# Patient Record
Sex: MALE | Race: WHITE | NOT HISPANIC OR LATINO | Employment: UNEMPLOYED | URBAN - METROPOLITAN AREA
[De-identification: names, ages, dates, MRNs, and addresses within clinical notes are randomized per-mention and may not be internally consistent; named-entity substitution may affect disease eponyms.]

---

## 2018-08-07 ENCOUNTER — HOSPITAL ENCOUNTER (EMERGENCY)
Facility: HOSPITAL | Age: 28
Discharge: HOME | End: 2018-08-07
Attending: FAMILY MEDICINE
Payer: COMMERCIAL

## 2018-08-07 VITALS
OXYGEN SATURATION: 100 % | TEMPERATURE: 99 F | DIASTOLIC BLOOD PRESSURE: 89 MMHG | WEIGHT: 243 LBS | HEART RATE: 120 BPM | HEIGHT: 73 IN | BODY MASS INDEX: 32.2 KG/M2 | RESPIRATION RATE: 17 BRPM | SYSTOLIC BLOOD PRESSURE: 152 MMHG

## 2018-08-07 DIAGNOSIS — S67.10XA CRUSHING INJURY OF FINGER, INITIAL ENCOUNTER: Primary | ICD-10-CM

## 2018-08-07 PROCEDURE — 73140 X-RAY EXAM OF FINGER(S): CPT | Mod: TC,FY,LT

## 2018-08-07 PROCEDURE — 99283 EMERGENCY DEPT VISIT LOW MDM: CPT | Mod: 25

## 2018-08-07 PROCEDURE — 25000003 PHARM REV CODE 250: Performed by: FAMILY MEDICINE

## 2018-08-07 PROCEDURE — 73140 X-RAY EXAM OF FINGER(S): CPT | Mod: 26,LT,, | Performed by: RADIOLOGY

## 2018-08-07 RX ORDER — CLINDAMYCIN HYDROCHLORIDE 150 MG/1
300 CAPSULE ORAL
Status: COMPLETED | OUTPATIENT
Start: 2018-08-07 | End: 2018-08-07

## 2018-08-07 RX ORDER — HYDROCODONE BITARTRATE AND ACETAMINOPHEN 5; 325 MG/1; MG/1
1 TABLET ORAL
Status: COMPLETED | OUTPATIENT
Start: 2018-08-07 | End: 2018-08-07

## 2018-08-07 RX ORDER — CLINDAMYCIN HYDROCHLORIDE 150 MG/1
300 CAPSULE ORAL EVERY 8 HOURS
Qty: 42 CAPSULE | Refills: 0 | Status: SHIPPED | OUTPATIENT
Start: 2018-08-07 | End: 2018-08-14

## 2018-08-07 RX ORDER — KETOROLAC TROMETHAMINE 10 MG/1
10 TABLET, FILM COATED ORAL EVERY 6 HOURS PRN
Qty: 12 TABLET | Refills: 0 | Status: SHIPPED | OUTPATIENT
Start: 2018-08-07 | End: 2020-03-13 | Stop reason: CLARIF

## 2018-08-07 RX ADMIN — HYDROCODONE BITARTRATE AND ACETAMINOPHEN 1 TABLET: 5; 325 TABLET ORAL at 02:08

## 2018-08-07 RX ADMIN — CLINDAMYCIN HYDROCHLORIDE 300 MG: 150 CAPSULE ORAL at 02:08

## 2018-08-07 NOTE — ED PROVIDER NOTES
22Encounter Date: 8/7/2018       History     Chief Complaint   Patient presents with    Hand Pain     Pt complains of pain to tip of left middle finger after sustaining a crush injury ealier today. No laceration.           Review of patient's allergies indicates:  No Known Allergies  No past medical history on file.  No past surgical history on file.  No family history on file.  Social History   Substance Use Topics    Smoking status: Not on file    Smokeless tobacco: Not on file    Alcohol use Not on file     Review of Systems   Constitutional: Negative.    HENT: Negative.    Eyes: Negative.    Respiratory: Negative.    Cardiovascular: Negative.    Gastrointestinal: Negative.    Endocrine: Negative.    Genitourinary: Negative.    Musculoskeletal:        See HPI   Neurological: Negative.    Hematological: Negative.    Psychiatric/Behavioral: Negative.        Physical Exam     Initial Vitals [08/07/18 0200]   BP Pulse Resp Temp SpO2   (!) 152/89 (!) 120 17 98.8 °F (37.1 °C) 100 %      MAP       --         Physical Exam    Nursing note and vitals reviewed.  Constitutional: He appears well-developed and well-nourished. He is not diaphoretic. No distress.   HENT:   Head: Normocephalic and atraumatic.   Eyes: Pupils are equal, round, and reactive to light.   Neck: Normal range of motion. Neck supple.   Cardiovascular: Normal rate, regular rhythm, normal heart sounds and intact distal pulses. Exam reveals no gallop and no friction rub.    No murmur heard.  Pulmonary/Chest: Breath sounds normal. No respiratory distress. He has no wheezes. He has no rhonchi. He has no rales. He exhibits no tenderness.   Abdominal: Soft. Bowel sounds are normal. He exhibits no distension. There is no tenderness. There is no rebound and no guarding.   Musculoskeletal: Normal range of motion. He exhibits tenderness. He exhibits no edema.   No joint deformity noted, no laceration to left middle finger. No subungual hematoma noted. Mild  bruising.    Neurological: He is alert and oriented to person, place, and time. He has normal strength.   Skin: Skin is warm and dry. Capillary refill takes less than 2 seconds.   Psychiatric: He has a normal mood and affect. His behavior is normal. Judgment and thought content normal.         ED Course   Procedures  Labs Reviewed - No data to display       Imaging Results          X-Ray Finger 2 or More Views Left (In process)                                    ED Course as of Aug 07 0242   Tue Aug 07, 2018   0236 X-ray to finger shows no fracture or dislocation.  Mild soft tissue swelling noted at area of injury.  [MD]   0238 No subungual hematoma.   [MD]      ED Course User Index  [MD] Adeola Randhawa MD     Clinical Impression:   The encounter diagnosis was Crushing injury of finger, initial encounter.                             Adeola Randhawa MD  08/07/18 0242       Adeola Randhawa MD  09/18/18 0933

## 2018-08-07 NOTE — ED TRIAGE NOTES
Patient arrives POV with wife tonight complaining of pain in the tip of the left middle finger. Patient states that he smashed his finger while working on an [employer] engine. Patient denies trying to relieve pain with ice or over the counter pain medicine. Patient rates pain at 5/10.

## 2020-03-13 ENCOUNTER — HOSPITAL ENCOUNTER (EMERGENCY)
Facility: HOSPITAL | Age: 30
Discharge: HOME | End: 2020-03-13
Attending: INTERNAL MEDICINE
Payer: COMMERCIAL

## 2020-03-13 VITALS
WEIGHT: 225 LBS | BODY MASS INDEX: 29.82 KG/M2 | OXYGEN SATURATION: 100 % | RESPIRATION RATE: 20 BRPM | SYSTOLIC BLOOD PRESSURE: 141 MMHG | TEMPERATURE: 99 F | HEART RATE: 104 BPM | HEIGHT: 73 IN | DIASTOLIC BLOOD PRESSURE: 73 MMHG

## 2020-03-13 DIAGNOSIS — K52.9 GASTROENTERITIS: Primary | ICD-10-CM

## 2020-03-13 DIAGNOSIS — R11.14 BILIOUS VOMITING WITH NAUSEA: ICD-10-CM

## 2020-03-13 LAB
ALBUMIN SERPL BCP-MCNC: 5.1 G/DL (ref 3.5–5.2)
ALP SERPL-CCNC: 69 U/L (ref 55–135)
ALT SERPL W/O P-5'-P-CCNC: 36 U/L (ref 10–44)
AMPHET+METHAMPHET UR QL: NEGATIVE
ANION GAP SERPL CALC-SCNC: 15 MMOL/L (ref 8–16)
AST SERPL-CCNC: 32 U/L (ref 10–40)
BARBITURATES UR QL SCN>200 NG/ML: NEGATIVE
BASOPHILS # BLD AUTO: 0.03 K/UL (ref 0–0.2)
BASOPHILS NFR BLD: 0.1 % (ref 0–1.9)
BENZODIAZ UR QL SCN>200 NG/ML: NEGATIVE
BILIRUB SERPL-MCNC: 1.1 MG/DL (ref 0.1–1)
BILIRUB UR QL STRIP: NEGATIVE
BUN SERPL-MCNC: 10 MG/DL (ref 6–20)
BZE UR QL SCN: NEGATIVE
CALCIUM SERPL-MCNC: 10.1 MG/DL (ref 8.7–10.5)
CANNABINOIDS UR QL SCN: NORMAL
CHLORIDE SERPL-SCNC: 102 MMOL/L (ref 95–110)
CLARITY UR: CLEAR
CO2 SERPL-SCNC: 25 MMOL/L (ref 23–29)
COLOR UR: YELLOW
CREAT SERPL-MCNC: 0.7 MG/DL (ref 0.5–1.4)
CREAT UR-MCNC: 166.5 MG/DL (ref 23–375)
DIFFERENTIAL METHOD: ABNORMAL
EOSINOPHIL # BLD AUTO: 0 K/UL (ref 0–0.5)
EOSINOPHIL NFR BLD: 0 % (ref 0–8)
ERYTHROCYTE [DISTWIDTH] IN BLOOD BY AUTOMATED COUNT: 12.1 % (ref 11.5–14.5)
EST. GFR  (AFRICAN AMERICAN): >60 ML/MIN/1.73 M^2
EST. GFR  (NON AFRICAN AMERICAN): >60 ML/MIN/1.73 M^2
GLUCOSE SERPL-MCNC: 130 MG/DL (ref 70–110)
GLUCOSE UR QL STRIP: NEGATIVE
GROUP A STREP, MOLECULAR: NEGATIVE
HCT VFR BLD AUTO: 46.7 % (ref 40–54)
HGB BLD-MCNC: 16.2 G/DL (ref 14–18)
HGB UR QL STRIP: NEGATIVE
IMM GRANULOCYTES # BLD AUTO: 0.08 K/UL (ref 0–0.04)
IMM GRANULOCYTES NFR BLD AUTO: 0.4 % (ref 0–0.5)
KETONES UR QL STRIP: ABNORMAL
LACTATE SERPL-SCNC: 1.9 MMOL/L (ref 0.5–2.2)
LEUKOCYTE ESTERASE UR QL STRIP: NEGATIVE
LIPASE SERPL-CCNC: 24 U/L (ref 4–60)
LYMPHOCYTES # BLD AUTO: 1.4 K/UL (ref 1–4.8)
LYMPHOCYTES NFR BLD: 6.2 % (ref 18–48)
MCH RBC QN AUTO: 29.6 PG (ref 27–31)
MCHC RBC AUTO-ENTMCNC: 34.7 G/DL (ref 32–36)
MCV RBC AUTO: 85 FL (ref 82–98)
MONOCYTES # BLD AUTO: 0.6 K/UL (ref 0.3–1)
MONOCYTES NFR BLD: 2.7 % (ref 4–15)
NEUTROPHILS # BLD AUTO: 19.9 K/UL (ref 1.8–7.7)
NEUTROPHILS NFR BLD: 90.6 % (ref 38–73)
NITRITE UR QL STRIP: NEGATIVE
NRBC BLD-RTO: 0 /100 WBC
OPIATES UR QL SCN: NEGATIVE
PCP UR QL SCN>25 NG/ML: NEGATIVE
PH UR STRIP: >=9 [PH] (ref 5–8)
PLATELET # BLD AUTO: 317 K/UL (ref 150–350)
PMV BLD AUTO: 9.8 FL (ref 9.2–12.9)
POTASSIUM SERPL-SCNC: 3.7 MMOL/L (ref 3.5–5.1)
PROT SERPL-MCNC: 8.7 G/DL (ref 6–8.4)
PROT UR QL STRIP: ABNORMAL
RBC # BLD AUTO: 5.48 M/UL (ref 4.6–6.2)
SODIUM SERPL-SCNC: 142 MMOL/L (ref 136–145)
SP GR UR STRIP: 1.01 (ref 1–1.03)
TOXICOLOGY INFORMATION: NORMAL
URN SPEC COLLECT METH UR: ABNORMAL
UROBILINOGEN UR STRIP-ACNC: NEGATIVE EU/DL
WBC # BLD AUTO: 21.92 K/UL (ref 3.9–12.7)

## 2020-03-13 PROCEDURE — 96365 THER/PROPH/DIAG IV INF INIT: CPT | Mod: 59

## 2020-03-13 PROCEDURE — 96367 TX/PROPH/DG ADDL SEQ IV INF: CPT

## 2020-03-13 PROCEDURE — 81003 URINALYSIS AUTO W/O SCOPE: CPT | Mod: 59

## 2020-03-13 PROCEDURE — 83605 ASSAY OF LACTIC ACID: CPT

## 2020-03-13 PROCEDURE — 87651 STREP A DNA AMP PROBE: CPT

## 2020-03-13 PROCEDURE — 96361 HYDRATE IV INFUSION ADD-ON: CPT

## 2020-03-13 PROCEDURE — 80053 COMPREHEN METABOLIC PANEL: CPT

## 2020-03-13 PROCEDURE — 74177 CT ABD & PELVIS W/CONTRAST: CPT | Mod: TC

## 2020-03-13 PROCEDURE — 96375 TX/PRO/DX INJ NEW DRUG ADDON: CPT

## 2020-03-13 PROCEDURE — 80307 DRUG TEST PRSMV CHEM ANLYZR: CPT

## 2020-03-13 PROCEDURE — 74177 CT ABD & PELVIS W/CONTRAST: CPT | Mod: 26,,, | Performed by: RADIOLOGY

## 2020-03-13 PROCEDURE — 85025 COMPLETE CBC W/AUTO DIFF WBC: CPT

## 2020-03-13 PROCEDURE — 99285 EMERGENCY DEPT VISIT HI MDM: CPT | Mod: 25

## 2020-03-13 PROCEDURE — 83690 ASSAY OF LIPASE: CPT

## 2020-03-13 PROCEDURE — 74177 CT ABDOMEN PELVIS WITH CONTRAST: ICD-10-PCS | Mod: 26,,, | Performed by: RADIOLOGY

## 2020-03-13 PROCEDURE — 25500020 PHARM REV CODE 255: Performed by: INTERNAL MEDICINE

## 2020-03-13 PROCEDURE — 63600175 PHARM REV CODE 636 W HCPCS: Performed by: INTERNAL MEDICINE

## 2020-03-13 RX ORDER — ONDANSETRON 2 MG/ML
4 INJECTION INTRAMUSCULAR; INTRAVENOUS
Status: COMPLETED | OUTPATIENT
Start: 2020-03-13 | End: 2020-03-13

## 2020-03-13 RX ORDER — CIPROFLOXACIN 500 MG/1
500 TABLET ORAL 2 TIMES DAILY
Qty: 20 TABLET | Refills: 0 | Status: SHIPPED | OUTPATIENT
Start: 2020-03-13 | End: 2020-03-13 | Stop reason: SDUPTHER

## 2020-03-13 RX ORDER — CIPROFLOXACIN 500 MG/1
500 TABLET ORAL 2 TIMES DAILY
Qty: 20 TABLET | Refills: 0 | Status: SHIPPED | OUTPATIENT
Start: 2020-03-13 | End: 2020-03-23

## 2020-03-13 RX ORDER — METRONIDAZOLE 500 MG/1
500 TABLET ORAL 3 TIMES DAILY
Qty: 21 TABLET | Refills: 0 | Status: SHIPPED | OUTPATIENT
Start: 2020-03-13 | End: 2020-03-20

## 2020-03-13 RX ORDER — PROMETHAZINE HYDROCHLORIDE 25 MG/1
25 TABLET ORAL EVERY 6 HOURS PRN
Qty: 15 TABLET | Refills: 0 | Status: SHIPPED | OUTPATIENT
Start: 2020-03-13 | End: 2021-03-19 | Stop reason: CLARIF

## 2020-03-13 RX ORDER — ONDANSETRON 2 MG/ML
4 INJECTION INTRAMUSCULAR; INTRAVENOUS
Status: DISCONTINUED | OUTPATIENT
Start: 2020-03-13 | End: 2020-03-14 | Stop reason: HOSPADM

## 2020-03-13 RX ORDER — CIPROFLOXACIN 2 MG/ML
400 INJECTION, SOLUTION INTRAVENOUS
Status: COMPLETED | OUTPATIENT
Start: 2020-03-13 | End: 2020-03-13

## 2020-03-13 RX ORDER — CIPROFLOXACIN 2 MG/ML
400 INJECTION, SOLUTION INTRAVENOUS
Status: DISCONTINUED | OUTPATIENT
Start: 2020-03-13 | End: 2020-03-13

## 2020-03-13 RX ADMIN — CIPROFLOXACIN 400 MG: 2 INJECTION, SOLUTION INTRAVENOUS at 09:03

## 2020-03-13 RX ADMIN — PROMETHAZINE HYDROCHLORIDE 12.5 MG: 25 INJECTION INTRAMUSCULAR; INTRAVENOUS at 09:03

## 2020-03-13 RX ADMIN — ONDANSETRON 4 MG: 2 INJECTION INTRAMUSCULAR; INTRAVENOUS at 08:03

## 2020-03-13 RX ADMIN — IOHEXOL 100 ML: 350 INJECTION, SOLUTION INTRAVENOUS at 08:03

## 2020-03-13 RX ADMIN — SODIUM CHLORIDE 1000 ML: 0.9 INJECTION, SOLUTION INTRAVENOUS at 08:03

## 2020-03-14 NOTE — ED PROVIDER NOTES
Encounter Date: 3/13/2020       History     Chief Complaint   Patient presents with    Abdominal Pain     Patient complaining of abdominal pain N&V x36 hours.    Nausea    Vomiting     Patient is a 29-year-old male that presented with retching and nausea with vomiting.  Patient states this has occurred over the last 36 hr.  Patient has denied any knows exposure to food or any inciting agent.  Otherwise patient has had no fever chills.  Patient states that he has not able to take anything orally without throwing up.  Patient denies any other past medical history.  Social history patient uses alcohol and does admit some drug use.  Patient's temperature is 98° wound O2 sats 100% patient is tachycardic.        Review of patient's allergies indicates:  No Known Allergies  History reviewed. No pertinent past medical history.  Past Surgical History:   Procedure Laterality Date    FOOT SURGERY Left      History reviewed. No pertinent family history.  Social History     Tobacco Use    Smoking status: Current Every Day Smoker   Substance Use Topics    Alcohol use: Yes     Comment: occ    Drug use: Yes     Comment: Opiates     Review of Systems   Constitutional: Negative for activity change, appetite change, fatigue and fever.   HENT: Positive for sore throat. Negative for congestion, ear discharge, mouth sores, nosebleeds, rhinorrhea, sinus pressure, sinus pain and tinnitus.    Eyes: Negative.  Negative for pain, redness and itching.   Respiratory: Negative for apnea, cough, choking, chest tightness, shortness of breath, wheezing and stridor.    Cardiovascular: Negative for chest pain, palpitations and leg swelling.   Gastrointestinal: Positive for abdominal pain, nausea and vomiting. Negative for abdominal distention, anal bleeding, blood in stool, constipation and diarrhea.   Endocrine: Negative.    Genitourinary: Negative for difficulty urinating, flank pain, frequency and urgency.   Musculoskeletal: Negative for  arthralgias, back pain, gait problem and myalgias.   Skin: Negative for color change and pallor.   Allergic/Immunologic: Negative.    Neurological: Positive for dizziness, weakness and light-headedness. Negative for facial asymmetry and headaches.   Hematological: Negative for adenopathy. Does not bruise/bleed easily.   Psychiatric/Behavioral: The patient is nervous/anxious.        Physical Exam     Initial Vitals [03/13/20 1950]   BP Pulse Resp Temp SpO2   (!) 141/73 104 20 98.1 °F (36.7 °C) 100 %      MAP       --         Physical Exam    Nursing note and vitals reviewed.  Constitutional: He appears well-developed and well-nourished.   HENT:   Head: Normocephalic and atraumatic.   Cardiovascular: Normal rate and normal heart sounds.   Pulmonary/Chest: Breath sounds normal.   Abdominal: Bowel sounds are increased. There is generalized tenderness.   Neurological: He is alert.   Skin: Skin is warm and dry. Capillary refill takes less than 2 seconds.   Psychiatric: He has a normal mood and affect. His behavior is normal.         ED Course   Procedures  Labs Reviewed   CBC W/ AUTO DIFFERENTIAL   COMPREHENSIVE METABOLIC PANEL   LIPASE   URINALYSIS, REFLEX TO URINE CULTURE   LACTIC ACID, PLASMA          Imaging Results    None          Medical Decision Making:   Clinical Tests:   Lab Tests: Ordered and Reviewed  The following lab test(s) were unremarkable: CBC, CMP and Lipase  ED Management:  Patient was found have a 21,900 white count with left shift.  Patient has no fever.  Thought out nausea vomiting related to acute gastroenteritis that can be treated as an outpatient.  Patient will be placed on antibiotics empirically and symptomatically treated at home.  Patient was rehydrated here in the emergency department and will be past follow-up with primary care physician 1-2 days if no improvement                   ED Course as of Mar 13 2209   Fri Mar 13, 2020   2207 Patient with [place] 47[address].  CBC is  otherwise otherwise negative  Urinalysis was negative  Group a strep negative    [JK]      ED Course User Index  [JK] Umberto Granados MD                Clinical Impression:       ICD-10-CM ICD-9-CM   1. Gastroenteritis K52.9 558.9   2. Bilious vomiting with nausea R11.14 787.04                                Umberto Granados MD  03/13/20 2220

## 2020-03-20 ENCOUNTER — HOSPITAL ENCOUNTER (EMERGENCY)
Facility: HOSPITAL | Age: 30
Discharge: HOME | End: 2020-03-20
Attending: FAMILY MEDICINE
Payer: COMMERCIAL

## 2020-03-20 VITALS
OXYGEN SATURATION: 99 % | SYSTOLIC BLOOD PRESSURE: 128 MMHG | HEART RATE: 88 BPM | RESPIRATION RATE: 20 BRPM | DIASTOLIC BLOOD PRESSURE: 72 MMHG | WEIGHT: 220 LBS | TEMPERATURE: 99 F | BODY MASS INDEX: 29.16 KG/M2 | HEIGHT: 73 IN

## 2020-03-20 DIAGNOSIS — J02.9 ACUTE PHARYNGITIS, UNSPECIFIED ETIOLOGY: Primary | ICD-10-CM

## 2020-03-20 LAB
ALBUMIN SERPL BCP-MCNC: 4.5 G/DL (ref 3.5–5.2)
ALP SERPL-CCNC: 58 U/L (ref 55–135)
ALT SERPL W/O P-5'-P-CCNC: 21 U/L (ref 10–44)
ANION GAP SERPL CALC-SCNC: 8 MMOL/L (ref 8–16)
AST SERPL-CCNC: 19 U/L (ref 10–40)
BASOPHILS # BLD AUTO: 0.03 K/UL (ref 0–0.2)
BASOPHILS NFR BLD: 0.2 % (ref 0–1.9)
BILIRUB SERPL-MCNC: 0.5 MG/DL (ref 0.1–1)
BUN SERPL-MCNC: 13 MG/DL (ref 6–20)
CALCIUM SERPL-MCNC: 9.1 MG/DL (ref 8.7–10.5)
CHLORIDE SERPL-SCNC: 96 MMOL/L (ref 95–110)
CO2 SERPL-SCNC: 33 MMOL/L (ref 23–29)
CREAT SERPL-MCNC: 0.8 MG/DL (ref 0.5–1.4)
DIFFERENTIAL METHOD: ABNORMAL
EOSINOPHIL # BLD AUTO: 0.1 K/UL (ref 0–0.5)
EOSINOPHIL NFR BLD: 0.7 % (ref 0–8)
ERYTHROCYTE [DISTWIDTH] IN BLOOD BY AUTOMATED COUNT: 11.9 % (ref 11.5–14.5)
EST. GFR  (AFRICAN AMERICAN): >60 ML/MIN/1.73 M^2
EST. GFR  (NON AFRICAN AMERICAN): >60 ML/MIN/1.73 M^2
GLUCOSE SERPL-MCNC: 108 MG/DL (ref 70–110)
GROUP A STREP, MOLECULAR: NEGATIVE
HCT VFR BLD AUTO: 40.3 % (ref 40–54)
HETEROPH AB SERPL QL IA: NEGATIVE
HGB BLD-MCNC: 13.7 G/DL (ref 14–18)
IMM GRANULOCYTES # BLD AUTO: 0.1 K/UL (ref 0–0.04)
IMM GRANULOCYTES NFR BLD AUTO: 0.7 % (ref 0–0.5)
LYMPHOCYTES # BLD AUTO: 1.7 K/UL (ref 1–4.8)
LYMPHOCYTES NFR BLD: 12.9 % (ref 18–48)
MCH RBC QN AUTO: 29.4 PG (ref 27–31)
MCHC RBC AUTO-ENTMCNC: 34 G/DL (ref 32–36)
MCV RBC AUTO: 87 FL (ref 82–98)
MONOCYTES # BLD AUTO: 1.5 K/UL (ref 0.3–1)
MONOCYTES NFR BLD: 11.3 % (ref 4–15)
NEUTROPHILS # BLD AUTO: 9.9 K/UL (ref 1.8–7.7)
NEUTROPHILS NFR BLD: 74.2 % (ref 38–73)
NRBC BLD-RTO: 0 /100 WBC
PLATELET # BLD AUTO: 275 K/UL (ref 150–350)
PMV BLD AUTO: 9.9 FL (ref 9.2–12.9)
POTASSIUM SERPL-SCNC: 3.8 MMOL/L (ref 3.5–5.1)
PROT SERPL-MCNC: 7.8 G/DL (ref 6–8.4)
RBC # BLD AUTO: 4.66 M/UL (ref 4.6–6.2)
SODIUM SERPL-SCNC: 137 MMOL/L (ref 136–145)
WBC # BLD AUTO: 13.36 K/UL (ref 3.9–12.7)

## 2020-03-20 PROCEDURE — 96372 THER/PROPH/DIAG INJ SC/IM: CPT

## 2020-03-20 PROCEDURE — 99284 EMERGENCY DEPT VISIT MOD MDM: CPT | Mod: 25

## 2020-03-20 PROCEDURE — 80053 COMPREHEN METABOLIC PANEL: CPT

## 2020-03-20 PROCEDURE — 85025 COMPLETE CBC W/AUTO DIFF WBC: CPT

## 2020-03-20 PROCEDURE — 36415 COLL VENOUS BLD VENIPUNCTURE: CPT

## 2020-03-20 PROCEDURE — 87651 STREP A DNA AMP PROBE: CPT

## 2020-03-20 PROCEDURE — 63600175 PHARM REV CODE 636 W HCPCS: Performed by: FAMILY MEDICINE

## 2020-03-20 PROCEDURE — 86308 HETEROPHILE ANTIBODY SCREEN: CPT

## 2020-03-20 RX ORDER — METHYLPREDNISOLONE SOD SUCC 125 MG
125 VIAL (EA) INJECTION
Status: COMPLETED | OUTPATIENT
Start: 2020-03-20 | End: 2020-03-20

## 2020-03-20 RX ADMIN — METHYLPREDNISOLONE SODIUM SUCCINATE 125 MG: 125 INJECTION, POWDER, FOR SOLUTION INTRAMUSCULAR; INTRAVENOUS at 03:03

## 2020-03-20 NOTE — ED PROVIDER NOTES
Encounter Date: 3/20/2020       History   No chief complaint on file.    30-year-old male presents complaining of sore throat which has persisted for the past 2 days he has been on antibiotics has not been on steroids has not had fever or sneezing he has pain with swallowing but is handling his secretions        Review of patient's allergies indicates:  No Known Allergies  No past medical history on file.  Past Surgical History:   Procedure Laterality Date    FOOT SURGERY Left      No family history on file.  Social History     Tobacco Use    Smoking status: Current Every Day Smoker   Substance Use Topics    Alcohol use: Yes     Comment: occ    Drug use: Yes     Comment: Opiates     Review of Systems   Constitutional: Negative for fever.   HENT: Negative for sore throat.    Respiratory: Negative for shortness of breath.    Cardiovascular: Negative for chest pain.   Gastrointestinal: Negative for nausea.   Genitourinary: Negative for dysuria.   Musculoskeletal: Negative for back pain.   Skin: Negative for rash.   Neurological: Negative for weakness and light-headedness.   Hematological: Does not bruise/bleed easily.       Physical Exam     Initial Vitals [03/20/20 0120]   BP Pulse Resp Temp SpO2   133/74 96 20 98.6 °F (37 °C) 99 %      MAP       --         Physical Exam    Nursing note and vitals reviewed.  Constitutional: He appears well-developed and well-nourished. He is not diaphoretic. No distress.   HENT:   Head: Normocephalic and atraumatic.   Right Ear: External ear normal.   Left Ear: External ear normal.   Nose: Nose normal.   Mouth/Throat: Oropharynx is clear and moist. No oropharyngeal exudate.   Eyes: EOM are normal.   Neck: Normal range of motion. Neck supple. No tracheal deviation present.   Cardiovascular: Normal rate and regular rhythm.   No murmur heard.  Pulmonary/Chest: Breath sounds normal. No stridor. No respiratory distress. He has no rales.   Abdominal: Soft. He exhibits no distension and  no mass. There is no tenderness. There is no rebound.   Musculoskeletal: Normal range of motion. He exhibits no edema.   Lymphadenopathy:     He has no cervical adenopathy.   Neurological: He is alert and oriented to person, place, and time. He has normal strength.   Skin: Skin is warm and dry. Capillary refill takes less than 2 seconds. No pallor.   Psychiatric: He has a normal mood and affect.         ED Course   Procedures  Labs Reviewed   COMPREHENSIVE METABOLIC PANEL - Abnormal; Notable for the following components:       Result Value    CO2 33 (*)     All other components within normal limits   CBC W/ AUTO DIFFERENTIAL - Abnormal; Notable for the following components:    WBC 13.36 (*)     Hemoglobin 13.7 (*)     Immature Granulocytes 0.7 (*)     Gran # (ANC) 9.9 (*)     Immature Grans (Abs) 0.10 (*)     Mono # 1.5 (*)     Gran% 74.2 (*)     Lymph% 12.9 (*)     All other components within normal limits   GROUP A STREP, MOLECULAR   HETEROPHILE AB SCREEN   HETEROPHILE AB SCREEN   HETEROPHILE ANTIBODY TITER          Imaging Results    None                                          Clinical Impression:       ICD-10-CM ICD-9-CM   1. Acute pharyngitis, unspecified etiology J02.9 462             ED Disposition Condition    Discharge Stable        ED Prescriptions     None        Follow-up Information    None                                    Himanshu Jernigan MD  03/20/20 7505

## 2020-03-20 NOTE — ED NOTES
PT STATES HE HAD THE STOMACH VIRUS OVER THE LAST WEEK AND WAS PIT ON TWO ANTIBIOTICS. PTSTATES NOW HIS THROAT HURTS SO BAD AND FEELS LIKE HE HAS A GOLF BALL SIZE MASS IN IT.    PT THROAT BY LOOKING AND PALPITATING  IT ON BOTH SIDES IS LARGE AND SWOLLEN.

## 2020-03-20 NOTE — ED NOTES
Went over dc paperwork pt verbalaized understanding . Family came to pick him up pt ambulate out of the ER

## 2021-03-19 ENCOUNTER — HOSPITAL ENCOUNTER (EMERGENCY)
Facility: HOSPITAL | Age: 31
Discharge: HOME | End: 2021-03-19
Attending: EMERGENCY MEDICINE

## 2021-03-19 VITALS
TEMPERATURE: 98 F | SYSTOLIC BLOOD PRESSURE: 122 MMHG | BODY MASS INDEX: 27.83 KG/M2 | RESPIRATION RATE: 20 BRPM | OXYGEN SATURATION: 98 % | HEART RATE: 94 BPM | HEIGHT: 73 IN | WEIGHT: 210 LBS | DIASTOLIC BLOOD PRESSURE: 100 MMHG

## 2021-03-19 DIAGNOSIS — T15.01XA FOREIGN BODY OF RIGHT CORNEA WITH RESIDUAL MATERIAL, INITIAL ENCOUNTER: Primary | ICD-10-CM

## 2021-03-19 PROCEDURE — 25000003 PHARM REV CODE 250

## 2021-03-19 PROCEDURE — 25000003 PHARM REV CODE 250: Performed by: EMERGENCY MEDICINE

## 2021-03-19 PROCEDURE — 99283 EMERGENCY DEPT VISIT LOW MDM: CPT | Mod: 25

## 2021-03-19 PROCEDURE — 65222 REMOVE FOREIGN BODY FROM EYE: CPT | Mod: RT

## 2021-03-19 RX ORDER — ERYTHROMYCIN 5 MG/G
OINTMENT OPHTHALMIC
Qty: 1 TUBE | Refills: 0 | Status: SHIPPED | OUTPATIENT
Start: 2021-03-19

## 2021-03-19 RX ORDER — ERYTHROMYCIN 5 MG/G
OINTMENT OPHTHALMIC
Status: COMPLETED
Start: 2021-03-19 | End: 2021-03-19

## 2021-03-19 RX ORDER — TETRACAINE HYDROCHLORIDE 5 MG/ML
2 SOLUTION OPHTHALMIC
Status: COMPLETED | OUTPATIENT
Start: 2021-03-19 | End: 2021-03-19

## 2021-03-19 RX ORDER — ERYTHROMYCIN 5 MG/G
OINTMENT OPHTHALMIC
Status: COMPLETED | OUTPATIENT
Start: 2021-03-19 | End: 2021-03-19

## 2021-03-19 RX ADMIN — ERYTHROMYCIN 1 INCH: 5 OINTMENT OPHTHALMIC at 11:03

## 2021-03-19 RX ADMIN — FLUORESCEIN SODIUM 1 EACH: 1 STRIP OPHTHALMIC at 09:03

## 2021-03-19 RX ADMIN — TETRACAINE HYDROCHLORIDE 2 DROP: 5 SOLUTION OPHTHALMIC at 09:03

## 2021-09-20 ENCOUNTER — HOSPITAL ENCOUNTER (EMERGENCY)
Facility: HOSPITAL | Age: 31
Discharge: HOME | End: 2021-09-20
Attending: FAMILY MEDICINE
Payer: OTHER GOVERNMENT

## 2021-09-20 VITALS
HEART RATE: 98 BPM | SYSTOLIC BLOOD PRESSURE: 123 MMHG | WEIGHT: 220 LBS | BODY MASS INDEX: 29.16 KG/M2 | DIASTOLIC BLOOD PRESSURE: 82 MMHG | HEIGHT: 73 IN | RESPIRATION RATE: 15 BRPM | OXYGEN SATURATION: 98 % | TEMPERATURE: 98 F

## 2021-09-20 DIAGNOSIS — K52.9 GASTROENTERITIS: Primary | ICD-10-CM

## 2021-09-20 LAB
ALBUMIN SERPL BCP-MCNC: 4 G/DL (ref 3.5–5.2)
ALP SERPL-CCNC: 81 U/L (ref 55–135)
ALT SERPL W/O P-5'-P-CCNC: 24 U/L (ref 10–44)
ANION GAP SERPL CALC-SCNC: 13 MMOL/L (ref 8–16)
AST SERPL-CCNC: 20 U/L (ref 10–40)
BASOPHILS # BLD AUTO: 0.03 K/UL (ref 0–0.2)
BASOPHILS NFR BLD: 0.2 % (ref 0–1.9)
BILIRUB SERPL-MCNC: 0.6 MG/DL (ref 0.1–1)
BUN SERPL-MCNC: 8 MG/DL (ref 6–20)
CALCIUM SERPL-MCNC: 9.8 MG/DL (ref 8.7–10.5)
CHLORIDE SERPL-SCNC: 99 MMOL/L (ref 95–110)
CO2 SERPL-SCNC: 25 MMOL/L (ref 23–29)
CREAT SERPL-MCNC: 0.9 MG/DL (ref 0.5–1.4)
DIFFERENTIAL METHOD: ABNORMAL
EOSINOPHIL # BLD AUTO: 0.1 K/UL (ref 0–0.5)
EOSINOPHIL NFR BLD: 0.4 % (ref 0–8)
ERYTHROCYTE [DISTWIDTH] IN BLOOD BY AUTOMATED COUNT: 12.1 % (ref 11.5–14.5)
EST. GFR  (AFRICAN AMERICAN): >60 ML/MIN/1.73 M^2
EST. GFR  (NON AFRICAN AMERICAN): >60 ML/MIN/1.73 M^2
GLUCOSE SERPL-MCNC: 121 MG/DL (ref 70–110)
HCT VFR BLD AUTO: 49.8 % (ref 40–54)
HGB BLD-MCNC: 16.7 G/DL (ref 14–18)
IMM GRANULOCYTES # BLD AUTO: 0.04 K/UL (ref 0–0.04)
IMM GRANULOCYTES NFR BLD AUTO: 0.3 % (ref 0–0.5)
LIPASE SERPL-CCNC: 17 U/L (ref 4–60)
LYMPHOCYTES # BLD AUTO: 1.3 K/UL (ref 1–4.8)
LYMPHOCYTES NFR BLD: 10.4 % (ref 18–48)
MCH RBC QN AUTO: 29.6 PG (ref 27–31)
MCHC RBC AUTO-ENTMCNC: 33.5 G/DL (ref 32–36)
MCV RBC AUTO: 88 FL (ref 82–98)
MONOCYTES # BLD AUTO: 1.1 K/UL (ref 0.3–1)
MONOCYTES NFR BLD: 8.7 % (ref 4–15)
NEUTROPHILS # BLD AUTO: 9.7 K/UL (ref 1.8–7.7)
NEUTROPHILS NFR BLD: 80 % (ref 38–73)
NRBC BLD-RTO: 0 /100 WBC
PLATELET # BLD AUTO: 250 K/UL (ref 150–450)
PMV BLD AUTO: 8.7 FL (ref 9.2–12.9)
POTASSIUM SERPL-SCNC: 3.8 MMOL/L (ref 3.5–5.1)
PROT SERPL-MCNC: 8.1 G/DL (ref 6–8.4)
RBC # BLD AUTO: 5.64 M/UL (ref 4.6–6.2)
SARS-COV-2 RDRP RESP QL NAA+PROBE: NEGATIVE
SODIUM SERPL-SCNC: 137 MMOL/L (ref 136–145)
WBC # BLD AUTO: 12.11 K/UL (ref 3.9–12.7)

## 2021-09-20 PROCEDURE — 25000003 PHARM REV CODE 250: Performed by: NURSE PRACTITIONER

## 2021-09-20 PROCEDURE — 83690 ASSAY OF LIPASE: CPT | Performed by: FAMILY MEDICINE

## 2021-09-20 PROCEDURE — 80053 COMPREHEN METABOLIC PANEL: CPT | Performed by: FAMILY MEDICINE

## 2021-09-20 PROCEDURE — 99284 EMERGENCY DEPT VISIT MOD MDM: CPT | Mod: 25

## 2021-09-20 PROCEDURE — U0002 COVID-19 LAB TEST NON-CDC: HCPCS | Performed by: FAMILY MEDICINE

## 2021-09-20 PROCEDURE — 96360 HYDRATION IV INFUSION INIT: CPT

## 2021-09-20 PROCEDURE — 85025 COMPLETE CBC W/AUTO DIFF WBC: CPT | Performed by: FAMILY MEDICINE

## 2021-09-20 PROCEDURE — 25000003 PHARM REV CODE 250: Performed by: FAMILY MEDICINE

## 2021-09-20 RX ORDER — PROMETHAZINE HYDROCHLORIDE 25 MG/1
50 TABLET ORAL EVERY 6 HOURS PRN
Qty: 10 TABLET | Refills: 0 | Status: SHIPPED | OUTPATIENT
Start: 2021-09-20

## 2021-09-20 RX ORDER — ONDANSETRON 4 MG/1
4 TABLET, ORALLY DISINTEGRATING ORAL
Status: COMPLETED | OUTPATIENT
Start: 2021-09-20 | End: 2021-09-20

## 2021-09-20 RX ORDER — ONDANSETRON 4 MG/1
4 TABLET, FILM COATED ORAL EVERY 8 HOURS PRN
Qty: 8 TABLET | Refills: 0 | Status: SHIPPED | OUTPATIENT
Start: 2021-09-20

## 2021-09-20 RX ADMIN — ONDANSETRON 4 MG: 4 TABLET, ORALLY DISINTEGRATING ORAL at 01:09

## 2021-09-20 RX ADMIN — SODIUM CHLORIDE 1000 ML: 0.9 INJECTION, SOLUTION INTRAVENOUS at 02:09

## 2022-10-30 ENCOUNTER — HOSPITAL ENCOUNTER (EMERGENCY)
Facility: HOSPITAL | Age: 32
Discharge: ANOTHER HOSPITAL | End: 2022-10-30
Attending: EMERGENCY MEDICINE

## 2022-10-30 ENCOUNTER — HOSPITAL ENCOUNTER (INPATIENT)
Facility: HOSPITAL | Age: 32
LOS: 1 days | Discharge: LEFT BEFORE DISCHARGE | DRG: 872 | End: 2022-10-30
Attending: EMERGENCY MEDICINE | Admitting: HOSPITALIST

## 2022-10-30 VITALS
OXYGEN SATURATION: 96 % | HEART RATE: 109 BPM | DIASTOLIC BLOOD PRESSURE: 70 MMHG | SYSTOLIC BLOOD PRESSURE: 117 MMHG | RESPIRATION RATE: 21 BRPM | WEIGHT: 240 LBS | BODY MASS INDEX: 31.81 KG/M2 | TEMPERATURE: 99 F | HEIGHT: 73 IN

## 2022-10-30 VITALS
TEMPERATURE: 98 F | DIASTOLIC BLOOD PRESSURE: 84 MMHG | HEART RATE: 110 BPM | BODY MASS INDEX: 31.85 KG/M2 | HEIGHT: 73 IN | OXYGEN SATURATION: 97 % | WEIGHT: 240.31 LBS | SYSTOLIC BLOOD PRESSURE: 151 MMHG | RESPIRATION RATE: 18 BRPM

## 2022-10-30 DIAGNOSIS — R07.9 CHEST PAIN: ICD-10-CM

## 2022-10-30 DIAGNOSIS — J36 PERITONSILLAR ABSCESS: ICD-10-CM

## 2022-10-30 DIAGNOSIS — J02.0 STREP PHARYNGITIS: Primary | ICD-10-CM

## 2022-10-30 DIAGNOSIS — J02.9 PHARYNGITIS, UNSPECIFIED ETIOLOGY: Primary | ICD-10-CM

## 2022-10-30 DIAGNOSIS — A41.9 SEPSIS, DUE TO UNSPECIFIED ORGANISM, UNSPECIFIED WHETHER ACUTE ORGAN DYSFUNCTION PRESENT: ICD-10-CM

## 2022-10-30 DIAGNOSIS — R00.0 TACHYCARDIA: ICD-10-CM

## 2022-10-30 DIAGNOSIS — A41.9 SEPSIS WITHOUT ACUTE ORGAN DYSFUNCTION, DUE TO UNSPECIFIED ORGANISM: ICD-10-CM

## 2022-10-30 PROBLEM — J03.90 TONSILLITIS: Status: ACTIVE | Noted: 2022-10-30

## 2022-10-30 PROBLEM — J32.9 SINUSITIS: Status: ACTIVE | Noted: 2022-10-30

## 2022-10-30 PROBLEM — Z72.0 NICOTINE ABUSE: Status: ACTIVE | Noted: 2022-10-30

## 2022-10-30 LAB
ALBUMIN SERPL BCP-MCNC: 3.7 G/DL (ref 3.5–5.2)
ALP SERPL-CCNC: 80 U/L (ref 55–135)
ALT SERPL W/O P-5'-P-CCNC: 19 U/L (ref 10–44)
ANION GAP SERPL CALC-SCNC: 12 MMOL/L (ref 8–16)
ANION GAP SERPL CALC-SCNC: 13 MMOL/L (ref 8–16)
AST SERPL-CCNC: 15 U/L (ref 10–40)
BASOPHILS # BLD AUTO: 0.06 K/UL (ref 0–0.2)
BASOPHILS # BLD AUTO: 0.08 K/UL (ref 0–0.2)
BASOPHILS NFR BLD: 0.3 % (ref 0–1.9)
BASOPHILS NFR BLD: 0.4 % (ref 0–1.9)
BILIRUB SERPL-MCNC: 0.6 MG/DL (ref 0.1–1)
BUN SERPL-MCNC: 10 MG/DL (ref 6–20)
BUN SERPL-MCNC: 9 MG/DL (ref 6–20)
CALCIUM SERPL-MCNC: 9.7 MG/DL (ref 8.7–10.5)
CALCIUM SERPL-MCNC: 9.7 MG/DL (ref 8.7–10.5)
CHLORIDE SERPL-SCNC: 95 MMOL/L (ref 95–110)
CHLORIDE SERPL-SCNC: 99 MMOL/L (ref 95–110)
CO2 SERPL-SCNC: 25 MMOL/L (ref 23–29)
CO2 SERPL-SCNC: 27 MMOL/L (ref 23–29)
CREAT SERPL-MCNC: 0.8 MG/DL (ref 0.5–1.4)
CREAT SERPL-MCNC: 0.8 MG/DL (ref 0.5–1.4)
DIFFERENTIAL METHOD: ABNORMAL
DIFFERENTIAL METHOD: ABNORMAL
EOSINOPHIL # BLD AUTO: 0 K/UL (ref 0–0.5)
EOSINOPHIL # BLD AUTO: 0.3 K/UL (ref 0–0.5)
EOSINOPHIL NFR BLD: 0.1 % (ref 0–8)
EOSINOPHIL NFR BLD: 1.3 % (ref 0–8)
ERYTHROCYTE [DISTWIDTH] IN BLOOD BY AUTOMATED COUNT: 12.4 % (ref 11.5–14.5)
ERYTHROCYTE [DISTWIDTH] IN BLOOD BY AUTOMATED COUNT: 12.4 % (ref 11.5–14.5)
EST. GFR  (NO RACE VARIABLE): >60 ML/MIN/1.73 M^2
EST. GFR  (NO RACE VARIABLE): >60 ML/MIN/1.73 M^2
GLUCOSE SERPL-MCNC: 103 MG/DL (ref 70–110)
GLUCOSE SERPL-MCNC: 151 MG/DL (ref 70–110)
GROUP A STREP, MOLECULAR: POSITIVE
HCT VFR BLD AUTO: 41.1 % (ref 40–54)
HCT VFR BLD AUTO: 41.6 % (ref 40–54)
HETEROPH AB BLD QL IA: NEGATIVE
HGB BLD-MCNC: 14 G/DL (ref 14–18)
HGB BLD-MCNC: 14.2 G/DL (ref 14–18)
IMM GRANULOCYTES # BLD AUTO: 0.17 K/UL (ref 0–0.04)
IMM GRANULOCYTES # BLD AUTO: 0.32 K/UL (ref 0–0.04)
IMM GRANULOCYTES NFR BLD AUTO: 0.9 % (ref 0–0.5)
IMM GRANULOCYTES NFR BLD AUTO: 1.7 % (ref 0–0.5)
LACTATE SERPL-SCNC: 1.1 MMOL/L (ref 0.5–2.2)
LYMPHOCYTES # BLD AUTO: 1.3 K/UL (ref 1–4.8)
LYMPHOCYTES # BLD AUTO: 2.5 K/UL (ref 1–4.8)
LYMPHOCYTES NFR BLD: 13.5 % (ref 18–48)
LYMPHOCYTES NFR BLD: 6.4 % (ref 18–48)
MCH RBC QN AUTO: 28.7 PG (ref 27–31)
MCH RBC QN AUTO: 28.8 PG (ref 27–31)
MCHC RBC AUTO-ENTMCNC: 34.1 G/DL (ref 32–36)
MCHC RBC AUTO-ENTMCNC: 34.1 G/DL (ref 32–36)
MCV RBC AUTO: 84 FL (ref 82–98)
MCV RBC AUTO: 85 FL (ref 82–98)
MONOCYTES # BLD AUTO: 0.4 K/UL (ref 0.3–1)
MONOCYTES # BLD AUTO: 2.2 K/UL (ref 0.3–1)
MONOCYTES NFR BLD: 1.9 % (ref 4–15)
MONOCYTES NFR BLD: 11.7 % (ref 4–15)
NEUTROPHILS # BLD AUTO: 13.3 K/UL (ref 1.8–7.7)
NEUTROPHILS # BLD AUTO: 17.7 K/UL (ref 1.8–7.7)
NEUTROPHILS NFR BLD: 71.4 % (ref 38–73)
NEUTROPHILS NFR BLD: 90.4 % (ref 38–73)
NRBC BLD-RTO: 0 /100 WBC
NRBC BLD-RTO: 0 /100 WBC
PLATELET # BLD AUTO: 297 K/UL (ref 150–450)
PLATELET # BLD AUTO: 315 K/UL (ref 150–450)
PMV BLD AUTO: 8.9 FL (ref 9.2–12.9)
PMV BLD AUTO: 9.2 FL (ref 9.2–12.9)
POTASSIUM SERPL-SCNC: 4.1 MMOL/L (ref 3.5–5.1)
POTASSIUM SERPL-SCNC: 4.7 MMOL/L (ref 3.5–5.1)
PROT SERPL-MCNC: 8.3 G/DL (ref 6–8.4)
RBC # BLD AUTO: 4.86 M/UL (ref 4.6–6.2)
RBC # BLD AUTO: 4.95 M/UL (ref 4.6–6.2)
SARS-COV-2 RDRP RESP QL NAA+PROBE: NEGATIVE
SODIUM SERPL-SCNC: 134 MMOL/L (ref 136–145)
SODIUM SERPL-SCNC: 137 MMOL/L (ref 136–145)
WBC # BLD AUTO: 18.64 K/UL (ref 3.9–12.7)
WBC # BLD AUTO: 19.57 K/UL (ref 3.9–12.7)

## 2022-10-30 PROCEDURE — 25000003 PHARM REV CODE 250: Performed by: INTERNAL MEDICINE

## 2022-10-30 PROCEDURE — 80053 COMPREHEN METABOLIC PANEL: CPT | Performed by: EMERGENCY MEDICINE

## 2022-10-30 PROCEDURE — 96361 HYDRATE IV INFUSION ADD-ON: CPT

## 2022-10-30 PROCEDURE — 99284 PR EMERGENCY DEPT VISIT,LEVEL IV: ICD-10-PCS | Mod: ,,, | Performed by: EMERGENCY MEDICINE

## 2022-10-30 PROCEDURE — 25000003 PHARM REV CODE 250: Performed by: HOSPITALIST

## 2022-10-30 PROCEDURE — U0002 COVID-19 LAB TEST NON-CDC: HCPCS | Performed by: EMERGENCY MEDICINE

## 2022-10-30 PROCEDURE — 36415 COLL VENOUS BLD VENIPUNCTURE: CPT | Performed by: INTERNAL MEDICINE

## 2022-10-30 PROCEDURE — 87040 BLOOD CULTURE FOR BACTERIA: CPT | Performed by: EMERGENCY MEDICINE

## 2022-10-30 PROCEDURE — 25500020 PHARM REV CODE 255: Performed by: EMERGENCY MEDICINE

## 2022-10-30 PROCEDURE — 85025 COMPLETE CBC W/AUTO DIFF WBC: CPT | Performed by: EMERGENCY MEDICINE

## 2022-10-30 PROCEDURE — 99222 PR INITIAL HOSPITAL CARE,LEVL II: ICD-10-PCS | Mod: ,,, | Performed by: HOSPITALIST

## 2022-10-30 PROCEDURE — 70491 CT SOFT TISSUE NECK WITH CONTRAST: ICD-10-PCS | Mod: 26,,, | Performed by: RADIOLOGY

## 2022-10-30 PROCEDURE — 96375 TX/PRO/DX INJ NEW DRUG ADDON: CPT

## 2022-10-30 PROCEDURE — 99285 EMERGENCY DEPT VISIT HI MDM: CPT | Mod: 25

## 2022-10-30 PROCEDURE — 12000002 HC ACUTE/MED SURGE SEMI-PRIVATE ROOM

## 2022-10-30 PROCEDURE — 70491 CT SOFT TISSUE NECK W/DYE: CPT | Mod: 26,,, | Performed by: RADIOLOGY

## 2022-10-30 PROCEDURE — 99284 EMERGENCY DEPT VISIT MOD MDM: CPT | Mod: ,,, | Performed by: EMERGENCY MEDICINE

## 2022-10-30 PROCEDURE — 80048 BASIC METABOLIC PNL TOTAL CA: CPT | Mod: XB | Performed by: INTERNAL MEDICINE

## 2022-10-30 PROCEDURE — S4991 NICOTINE PATCH NONLEGEND: HCPCS | Performed by: HOSPITALIST

## 2022-10-30 PROCEDURE — 86308 HETEROPHILE ANTIBODY SCREEN: CPT | Performed by: EMERGENCY MEDICINE

## 2022-10-30 PROCEDURE — 83605 ASSAY OF LACTIC ACID: CPT | Performed by: EMERGENCY MEDICINE

## 2022-10-30 PROCEDURE — S4991 NICOTINE PATCH NONLEGEND: HCPCS | Performed by: INTERNAL MEDICINE

## 2022-10-30 PROCEDURE — 63600175 PHARM REV CODE 636 W HCPCS: Performed by: EMERGENCY MEDICINE

## 2022-10-30 PROCEDURE — 87651 STREP A DNA AMP PROBE: CPT | Performed by: EMERGENCY MEDICINE

## 2022-10-30 PROCEDURE — 25000003 PHARM REV CODE 250: Performed by: EMERGENCY MEDICINE

## 2022-10-30 PROCEDURE — 70491 CT SOFT TISSUE NECK W/DYE: CPT | Mod: TC

## 2022-10-30 PROCEDURE — 99285 EMERGENCY DEPT VISIT HI MDM: CPT | Mod: 25,27

## 2022-10-30 PROCEDURE — 85025 COMPLETE CBC W/AUTO DIFF WBC: CPT | Mod: 91 | Performed by: INTERNAL MEDICINE

## 2022-10-30 PROCEDURE — 96365 THER/PROPH/DIAG IV INF INIT: CPT

## 2022-10-30 PROCEDURE — 99222 1ST HOSP IP/OBS MODERATE 55: CPT | Mod: ,,, | Performed by: HOSPITALIST

## 2022-10-30 RX ORDER — KETOROLAC TROMETHAMINE 30 MG/ML
15 INJECTION, SOLUTION INTRAMUSCULAR; INTRAVENOUS EVERY 6 HOURS
Status: DISCONTINUED | OUTPATIENT
Start: 2022-10-30 | End: 2022-10-30 | Stop reason: HOSPADM

## 2022-10-30 RX ORDER — SODIUM CHLORIDE 0.9 % (FLUSH) 0.9 %
10 SYRINGE (ML) INJECTION
Status: DISCONTINUED | OUTPATIENT
Start: 2022-10-30 | End: 2022-10-30 | Stop reason: HOSPADM

## 2022-10-30 RX ORDER — SODIUM CHLORIDE 9 MG/ML
INJECTION, SOLUTION INTRAVENOUS CONTINUOUS
Status: DISCONTINUED | OUTPATIENT
Start: 2022-10-30 | End: 2022-10-30 | Stop reason: HOSPADM

## 2022-10-30 RX ORDER — MORPHINE SULFATE 2 MG/ML
2 INJECTION, SOLUTION INTRAMUSCULAR; INTRAVENOUS EVERY 4 HOURS PRN
Status: DISCONTINUED | OUTPATIENT
Start: 2022-10-30 | End: 2022-10-30 | Stop reason: HOSPADM

## 2022-10-30 RX ORDER — NALOXONE HCL 0.4 MG/ML
0.02 VIAL (ML) INJECTION
Status: DISCONTINUED | OUTPATIENT
Start: 2022-10-30 | End: 2022-10-30 | Stop reason: HOSPADM

## 2022-10-30 RX ORDER — KETOROLAC TROMETHAMINE 30 MG/ML
30 INJECTION, SOLUTION INTRAMUSCULAR; INTRAVENOUS
Status: COMPLETED | OUTPATIENT
Start: 2022-10-30 | End: 2022-10-30

## 2022-10-30 RX ORDER — IBUPROFEN 200 MG
24 TABLET ORAL
Status: DISCONTINUED | OUTPATIENT
Start: 2022-10-30 | End: 2022-10-30 | Stop reason: HOSPADM

## 2022-10-30 RX ORDER — CETIRIZINE HYDROCHLORIDE 5 MG/1
5 TABLET ORAL DAILY
Status: DISCONTINUED | OUTPATIENT
Start: 2022-10-31 | End: 2022-10-30 | Stop reason: HOSPADM

## 2022-10-30 RX ORDER — ACETAMINOPHEN 325 MG/1
650 TABLET ORAL EVERY 6 HOURS PRN
Status: DISCONTINUED | OUTPATIENT
Start: 2022-10-30 | End: 2022-10-30 | Stop reason: HOSPADM

## 2022-10-30 RX ORDER — IBUPROFEN 200 MG
1 TABLET ORAL DAILY
Status: DISCONTINUED | OUTPATIENT
Start: 2022-10-30 | End: 2022-10-30 | Stop reason: HOSPADM

## 2022-10-30 RX ORDER — TALC
6 POWDER (GRAM) TOPICAL NIGHTLY PRN
Status: DISCONTINUED | OUTPATIENT
Start: 2022-10-30 | End: 2022-10-30 | Stop reason: HOSPADM

## 2022-10-30 RX ORDER — IBUPROFEN 200 MG
16 TABLET ORAL
Status: DISCONTINUED | OUTPATIENT
Start: 2022-10-30 | End: 2022-10-30 | Stop reason: HOSPADM

## 2022-10-30 RX ORDER — ALBUTEROL SULFATE 5 MG/ML
2.5 SOLUTION RESPIRATORY (INHALATION) EVERY 4 HOURS PRN
Status: DISCONTINUED | OUTPATIENT
Start: 2022-10-30 | End: 2022-10-30 | Stop reason: HOSPADM

## 2022-10-30 RX ORDER — IBUPROFEN 200 MG
1 TABLET ORAL DAILY
Status: DISCONTINUED | OUTPATIENT
Start: 2022-10-31 | End: 2022-10-30

## 2022-10-30 RX ORDER — HYDROCODONE BITARTRATE AND ACETAMINOPHEN 5; 325 MG/1; MG/1
1 TABLET ORAL EVERY 6 HOURS PRN
Status: DISCONTINUED | OUTPATIENT
Start: 2022-10-30 | End: 2022-10-30 | Stop reason: HOSPADM

## 2022-10-30 RX ORDER — DEXAMETHASONE SODIUM PHOSPHATE 10 MG/ML
10 INJECTION INTRAMUSCULAR; INTRAVENOUS
Status: COMPLETED | OUTPATIENT
Start: 2022-10-30 | End: 2022-10-30

## 2022-10-30 RX ORDER — GLUCAGON 1 MG
1 KIT INJECTION
Status: DISCONTINUED | OUTPATIENT
Start: 2022-10-30 | End: 2022-10-30 | Stop reason: HOSPADM

## 2022-10-30 RX ORDER — SODIUM CHLORIDE 9 MG/ML
1000 INJECTION, SOLUTION INTRAVENOUS
Status: COMPLETED | OUTPATIENT
Start: 2022-10-30 | End: 2022-10-30

## 2022-10-30 RX ORDER — FLUTICASONE PROPIONATE 50 MCG
2 SPRAY, SUSPENSION (ML) NASAL DAILY
Status: DISCONTINUED | OUTPATIENT
Start: 2022-10-31 | End: 2022-10-30 | Stop reason: HOSPADM

## 2022-10-30 RX ORDER — SODIUM CHLORIDE 0.9 % (FLUSH) 0.9 %
10 SYRINGE (ML) INJECTION EVERY 12 HOURS PRN
Status: DISCONTINUED | OUTPATIENT
Start: 2022-10-30 | End: 2022-10-30 | Stop reason: HOSPADM

## 2022-10-30 RX ADMIN — SODIUM CHLORIDE 1000 ML: 0.9 INJECTION, SOLUTION INTRAVENOUS at 06:10

## 2022-10-30 RX ADMIN — Medication 1 PATCH: at 10:10

## 2022-10-30 RX ADMIN — Medication 1 PATCH: at 04:10

## 2022-10-30 RX ADMIN — KETOROLAC TROMETHAMINE 30 MG: 30 INJECTION, SOLUTION INTRAMUSCULAR at 02:10

## 2022-10-30 RX ADMIN — CEFTRIAXONE 2 G: 2 INJECTION, SOLUTION INTRAVENOUS at 02:10

## 2022-10-30 RX ADMIN — IOHEXOL 100 ML: 350 INJECTION, SOLUTION INTRAVENOUS at 02:10

## 2022-10-30 RX ADMIN — SODIUM CHLORIDE 1000 ML: 0.9 INJECTION, SOLUTION INTRAVENOUS at 01:10

## 2022-10-30 RX ADMIN — DEXAMETHASONE SODIUM PHOSPHATE 10 MG: 10 INJECTION INTRAMUSCULAR; INTRAVENOUS at 01:10

## 2022-10-30 NOTE — HPI
32M w 3 days of sore throat, odynophagia, started on the L now worse on the R. Denies fever or chills. Able to tolerate his own secretions. Seen at OSH, reported severe sore throat and unable to tolerate PO - concern for peritonsillar abscess. Has since been treated w IV decadron and and abx and feels better. Still w pain, but able to tolerate PO. No hx of similar sxs. No trismus. No difficulty breathing.

## 2022-10-30 NOTE — ED NOTES
Pt lying in the bed with eyes closed at this time. Pt snoring. Pts oxygen saturation in the 70s, pt has venti mask lying in the bed next to home. Placed venti mask back onto pt, educated pt that he needs to keep this on while sleeping pts instructed to take several deep breaths. Pts oxygen saturation increase to 100%. Pts wife at bedside. Pt states that he is having pain in his throat pt states that it is painful to swallow secretions. Pt and wife updated on pts transfer status at this time.

## 2022-10-30 NOTE — SUBJECTIVE & OBJECTIVE
Medications:  Continuous Infusions:  Scheduled Meds:  PRN Meds:     Current Facility-Administered Medications on File Prior to Encounter   Medication    [COMPLETED] 0.9%  NaCl infusion    [COMPLETED] cefTRIAXone (ROCEPHIN) 2 g/50 mL D5W IVPB    [COMPLETED] dexAMETHasone sodium phos (PF) injection 10 mg    [COMPLETED] iohexoL (OMNIPAQUE 350) injection 100 mL    [COMPLETED] ketorolac injection 30 mg    [COMPLETED] sodium chloride 0.9% bolus 1,000 mL    [DISCONTINUED] nicotine 21 mg/24 hr 1 patch     Current Outpatient Medications on File Prior to Encounter   Medication Sig    erythromycin (ROMYCIN) ophthalmic ointment Place a 1/2 inch ribbon of ointment into the lower eyelid.    ondansetron (ZOFRAN) 4 MG tablet Take 1 tablet (4 mg total) by mouth every 8 (eight) hours as needed for Nausea.    promethazine (PHENERGAN) 25 MG tablet Take 2 tablets (50 mg total) by mouth every 6 (six) hours as needed for Nausea.       Review of patient's allergies indicates:  No Known Allergies    No past medical history on file.  Past Surgical History:   Procedure Laterality Date    FOOT SURGERY Left      Family History    None       Tobacco Use    Smoking status: Every Day     Packs/day: 0.50     Types: Cigarettes    Smokeless tobacco: Never   Substance and Sexual Activity    Alcohol use: Yes     Comment: occ    Drug use: Not Currently     Comment: Opiates    Sexual activity: Yes     Review of Systems   Constitutional:  Negative for chills, fever and unexpected weight change.   HENT:  Positive for sore throat and trouble swallowing. Negative for congestion, ear discharge, ear pain, hearing loss, nosebleeds and voice change.    Eyes:  Negative for pain and visual disturbance.   Respiratory:  Negative for cough, shortness of breath and stridor.    Cardiovascular:  Negative for chest pain.   Gastrointestinal:  Negative for abdominal pain, nausea and vomiting.   Skin:  Negative for rash and wound.   Objective:     Vital Signs (Most  Recent):  Temp: 98 °F (36.7 °C) (10/30/22 1413)  Pulse: 110 (10/30/22 1503)  Resp: 18 (10/30/22 1413)  BP: (!) 144/66 (10/30/22 1433)  SpO2: 96 % (10/30/22 1433)   Vital Signs (24h Range):  Temp:  [98 °F (36.7 °C)-99.9 °F (37.7 °C)] 98 °F (36.7 °C)  Pulse:  [] 110  Resp:  [18-22] 18  SpO2:  [95 %-100 %] 96 %  BP: (110-166)/(62-97) 144/66     Weight: 109 kg (240 lb 4.8 oz)  Body mass index is 31.7 kg/m².        Physical Exam  Vitals reviewed.   Constitutional:       General: He is not in acute distress.     Appearance: Normal appearance. He is normal weight. He is not ill-appearing or toxic-appearing.   HENT:      Head: Normocephalic and atraumatic.      Jaw: No trismus, tenderness or malocclusion.      Salivary Glands: Right salivary gland is not diffusely enlarged or tender. Left salivary gland is not diffusely enlarged or tender.      Right Ear: Hearing and external ear normal. No decreased hearing noted. No drainage.      Left Ear: Hearing and external ear normal. No decreased hearing noted. No drainage.      Nose: Nose normal. No nasal deformity, signs of injury, congestion or rhinorrhea.      Right Nostril: No epistaxis.      Left Nostril: No epistaxis.      Mouth/Throat:      Lips: Pink. No lesions.      Mouth: Mucous membranes are moist. No oral lesions or angioedema.      Dentition: No gum lesions.      Tongue: No lesions. Tongue does not deviate from midline.      Palate: No mass and lesions.      Pharynx: Oropharynx is clear. Uvula midline. Posterior oropharyngeal erythema present. No pharyngeal swelling.      Tonsils: No tonsillar exudate or tonsillar abscesses.      Comments: Tonsillar erythema, 4+ bilat, R slightly larger  Mild palatal edema  Tonsils are indurated, no fluctuance palpable, tender  No exudate  Eyes:      General: Lids are normal.         Right eye: No discharge.         Left eye: No discharge.      Extraocular Movements: Extraocular movements intact.      Conjunctiva/sclera:  Conjunctivae normal.      Pupils: Pupils are equal, round, and reactive to light.   Neck:      Thyroid: No thyroid mass or thyromegaly.      Trachea: Trachea and phonation normal.   Cardiovascular:      Rate and Rhythm: Normal rate.   Pulmonary:      Effort: Pulmonary effort is normal. No accessory muscle usage, respiratory distress or retractions.      Breath sounds: No stridor. No wheezing.   Musculoskeletal:      Cervical back: Normal range of motion and neck supple. Tenderness present. No muscular tenderness.   Lymphadenopathy:      Cervical: Cervical adenopathy (R) present.   Neurological:      Mental Status: He is alert and oriented to person, place, and time.      Cranial Nerves: No facial asymmetry.       Significant Labs:  BMP:   Recent Labs   Lab 10/30/22  0610   *   CL 99   CO2 25   BUN 10   CREATININE 0.8   CALCIUM 9.7     CBC:   Recent Labs   Lab 10/30/22  0610   WBC 19.57*   RBC 4.95   HGB 14.2   HCT 41.6      MCV 84   MCH 28.7   MCHC 34.1     Microbiology Results (last 7 days)       ** No results found for the last 168 hours. **            Significant Diagnostics:  CT: I have reviewed all pertinent results/findings within the past 24 hours and my personal findings are:  peritonsillar phegmon on the R, no drainable abscess

## 2022-10-30 NOTE — ED NOTES
Spoke to [facility] for status, [facility] dispatch states that they have several units out of town at this time and do not have an estimated time of arrival at this time.

## 2022-10-30 NOTE — ED PROVIDER NOTES
Encounter Date: 10/30/2022       History     Chief Complaint   Patient presents with    [facility]      [facility] for ENT, peritonsillar abscess, Airway patent, no respiratory distress noted. Pain 4 out of 10.      HPI  32-year-old male with no significant past medical history, denies drug use, coming in transfer from [facility] for throat infection/peritonsillar phlegmon.  He reports 3 days of sore throat, lymph node swelling, progressively worsening, pain with swallowing, hoarse voice.  Currently says his symptoms are significantly improved, is able to swallow with pain, he still feels his voice is hoarse.  He states that prior the other emergency department he felt like he could not swallow secondary to pain, and came short of breath.  Denies current shortness of breath, no chest pain, no abdominal pain.  No other complaints at this time.  At the outside hospital he was given antibiotics, IV fluids, steroids, Toradol.    Review of patient's allergies indicates:  No Known Allergies  History reviewed. No pertinent past medical history.  Past Surgical History:   Procedure Laterality Date    FOOT SURGERY Left      History reviewed. No pertinent family history.  Social History     Tobacco Use    Smoking status: Every Day     Packs/day: 0.50     Types: Cigarettes    Smokeless tobacco: Never   Substance Use Topics    Alcohol use: Yes     Comment: occ    Drug use: Not Currently     Comment: Opiates     Review of Systems    Constitutional:  No Fever, No Chills,   Eyes: No Vision Changes  ENT/Mouth:  Positive sore throat, No rhinorrhea  Cardiovascular:  No Chest Pain, No Palpitations  Respiratory:  No Cough, No SOB  Gastrointestinal:  No Nausea, No Vomiting, No Diarrhea, No abdo pain.  Genitourinary:  No  pain, No dysuria   Musculoskeletal:  No Arthralgias, No Back Pain, No Neck Pain, No recent trauma.  Skin:  No skin Lesions  Neuro:  No Weakness, No Numbness, No Paresthesias, No Dizziness, No  Headache      Physical Exam     Initial Vitals [10/30/22 1413]   BP Pulse Resp Temp SpO2   124/86 110 18 98 °F (36.7 °C) 96 %      MAP       --         Physical Exam    Physical Exam:  CONSTITUTIONAL: Well developed, well nourished, in no acute distress.  HENT: Normocephalic, atraumatic, mildly hoarse voice, tonsils are mildly swollen b/l with no exudate, no trismus, uvula is midline, no sub mental swelling, no tongue elevation, no neck tenderness.  Subtle supple neck.  EYES: Sclerae anicteric   NECK: Supple, no thyroid enlargement  CARDIOVASCULAR: Regular rate and rhythm without any murmurs, gallops, rubs.  RESPIRATORY: Speaking in full sentences. Breathing comfortably. Auscultation of the lungs revealed normal breath sounds b/l, no wheezing, no rales, no rhonchi.  ABDOMEN: Soft and nontender, no masses, no rebound or guarding   NEUROLOGIC: Alert, interacting normally. No facial droop.   MSK: Moving all four extremities.  Skin: Warm and dry. No visible rash on exposed areas of skin.    Psych: Mood and affect normal.       ED Course   Procedures  Labs Reviewed - No data to display       Imaging Results    None          Medications   sodium chloride 0.9% flush 10 mL (has no administration in time range)   melatonin tablet 6 mg (has no administration in time range)   ampicillin-sulbactam 1.5 g in sodium chloride 0.9 % 100 mL IVPB (ready to mix system) (has no administration in time range)   0.9%  NaCl infusion (has no administration in time range)   sodium chloride 0.9% flush 10 mL (has no administration in time range)   naloxone 0.4 mg/mL injection 0.02 mg (has no administration in time range)   glucose chewable tablet 16 g (has no administration in time range)   glucose chewable tablet 24 g (has no administration in time range)   glucagon (human recombinant) injection 1 mg (has no administration in time range)   dextrose 10% bolus 125 mL (has no administration in time range)   dextrose 10% bolus 250 mL (has no  administration in time range)   morphine injection 2 mg (has no administration in time range)   acetaminophen tablet 650 mg (has no administration in time range)   HYDROcodone-acetaminophen 5-325 mg per tablet 1 tablet (has no administration in time range)   albuterol nebulizer solution 2.5 mg (has no administration in time range)   cetirizine tablet 5 mg (has no administration in time range)   fluticasone propionate 50 mcg/actuation nasal spray 100 mcg (has no administration in time range)   nicotine 21 mg/24 hr 1 patch (has no administration in time range)   ketorolac injection 15 mg (has no administration in time range)     Medical Decision Making:   History:   Old Medical Records: I decided to obtain old medical records.  Old Records Summarized: records from clinic visits.       <> Summary of Records: See HPI  Clinical Tests:   Lab Tests: Reviewed  Radiological Study: Reviewed  Other:   I have discussed this case with another health care provider.       Dose level:  High, complexity:  High, 5    32-year-old male with past medical history as noted coming in as a transfer for throat infection.  Initially at outside hospital was reportedly not tolerating secretions and short of breath, here he feels much better, is tolerating secretions, denies shortness of breath and is satting well on room air.  Throat exam with no large visible peritonsillar abscess.    Will continue IV fluids he is mildly tachycardic.  ENT is aware that he is in the hospital and will come evaluate the patient.  Offered pain medication but patient declines them at this time.    Update:    Seen by ENT, they did not think he would benefit from surgery or intervention, they recommend admission to Hospital Medicine for IV antibiotics and monitoring.  Patient continues to be comfortable, no concern for airway issues at the time.  Admit to Hospital Medicine for IV antibiotics and monitoring.  ENT will follow.                            Clinical  Impression:   Final diagnoses:  [J02.9] Pharyngitis, unspecified etiology (Primary)  [A41.9] Sepsis, due to unspecified organism, unspecified whether acute organ dysfunction present      ED Disposition Condition    Admit Stable                Rosales Plummer MD  10/30/22 5653

## 2022-10-30 NOTE — ED NOTES
Called [facility] for transportation update spoke to Larisa, she states they do not have an estimated time of arrival

## 2022-10-30 NOTE — ED PROVIDER NOTES
Encounter Date: 10/30/2022       History     Chief Complaint   Patient presents with    Lymphadenopathy     In throat x 3 days.      HPI  Review of patient's allergies indicates:  No Known Allergies  No past medical history on file.  Past Surgical History:   Procedure Laterality Date    FOOT SURGERY Left      No family history on file.  Social History     Tobacco Use    Smoking status: Every Day     Packs/day: 0.50     Types: Cigarettes    Smokeless tobacco: Never   Substance Use Topics    Alcohol use: Yes     Comment: occ    Drug use: Not Currently     Comment: Opiates     Review of Systems    Physical Exam     Initial Vitals [10/30/22 0125]   BP Pulse Resp Temp SpO2   129/86 (!) 132 (!) 22 99.1 °F (37.3 °C) 99 %      MAP       --         Physical Exam    ED Course   Procedures  Labs Reviewed   GROUP A STREP, MOLECULAR - Abnormal; Notable for the following components:       Result Value    Group A Strep, Molecular Positive (*)     All other components within normal limits   CBC W/ AUTO DIFFERENTIAL - Abnormal; Notable for the following components:    WBC 18.64 (*)     Immature Granulocytes 1.7 (*)     Gran # (ANC) 13.3 (*)     Immature Grans (Abs) 0.32 (*)     Mono # 2.2 (*)     Lymph % 13.5 (*)     All other components within normal limits   COMPREHENSIVE METABOLIC PANEL - Abnormal; Notable for the following components:    Sodium 134 (*)     All other components within normal limits   BASIC METABOLIC PANEL - Abnormal; Notable for the following components:    Glucose 151 (*)     All other components within normal limits   CBC W/ AUTO DIFFERENTIAL - Abnormal; Notable for the following components:    WBC 19.57 (*)     MPV 8.9 (*)     Immature Granulocytes 0.9 (*)     Gran # (ANC) 17.7 (*)     Immature Grans (Abs) 0.17 (*)     Gran % 90.4 (*)     Lymph % 6.4 (*)     Mono % 1.9 (*)     All other components within normal limits   CULTURE, BLOOD   CULTURE, BLOOD   HETEROPHILE AB SCREEN   SARS-COV-2 RNA AMPLIFICATION,  QUAL    Narrative:     Is the patient symptomatic?->No   LACTIC ACID, PLASMA          Imaging Results              CT Soft Tissue Neck With Contrast (Final result)  Result time 10/30/22 09:19:59      Final result by Shasha Tello MD (10/30/22 09:19:59)                   Impression:      Right-sided tonsillitis with findings concerning for developing tonsillar abscess tracking inferiorly toward the floor of the mouth.  Cervical adenopathy, likely reactive.  Sinusitis.      Electronically signed by: Shasha Tello  Date:    10/30/2022  Time:    09:19               Narrative:    EXAMINATION:  CT SOFT TISSUE NECK WITH CONTRAST    CLINICAL HISTORY:  Neck abscess, deep tissue;right submandibular pain    TECHNIQUE:  Low dose axial images as well as sagittal and coronal reconstructions were performed from the skull base to the clavicles following the intravenous administration of 100 mL of Omnipaque 350.    COMPARISON:  None    FINDINGS:  No intracranial abnormalities are noted.    The orbits, nasopharynx are normal, aside from mild prominence of the adenoidal soft tissues.    There is enlargement of the right oropharyngeal tonsil.  There is an ill-defined central area of decreased attenuation concerning for developing abscess within it;  this measures 19 x 15 x 20 mm and does not have well-defined margins.  It extends toward the floor of the mouth as seen on series 2, image 47, adjacent to the submandibular gland.  Submandibular gland is not enlarged, and appears symmetrical to the contralateral side.    There is an enlarged right submandibular lymph node with a short axis diameter of 14 mm on series 2, image 58, likely reactive.  There is an enlarged level 2 left IJ lymph node with a short axis diameter of 15 mm on series 2, image 50.    The epiglottis, larynx, thyroid gland, superior mediastinum and visualized portions of the lungs are normal.    There is mucosal thickening throughout the frontal sinus and  bilateral and anterior ethmoid air cells.    No acute findings noted in the bones.                        ED Interpretation by Richard Richards MD (10/30/22 05:55:50, [facility] Emergency [facility], Emergency Medicine)    CT of the neck shows a right tonsillar abscess with ill-defined disc the 16 x 10 x 22 mm and a right submandibular adenopathy                                     Medications   nicotine 21 mg/24 hr 1 patch (has no administration in time range)   sodium chloride 0.9% bolus 1,000 mL (0 mLs Intravenous Stopped 10/30/22 0247)   dexAMETHasone sodium phos (PF) injection 10 mg (10 mg Intravenous Given 10/30/22 0131)   cefTRIAXone (ROCEPHIN) 2 g/50 mL D5W IVPB (0 g Intravenous Stopped 10/30/22 0346)   iohexoL (OMNIPAQUE 350) injection 100 mL (100 mLs Intravenous Given 10/30/22 0231)   ketorolac injection 30 mg (30 mg Intravenous Given 10/30/22 0258)   0.9%  NaCl infusion (1,000 mLs Intravenous New Bag 10/30/22 0614)     Medical Decision Making:   ED Management:  Patient awaiting transfer           ED Course as of 10/30/22 1042   Sun Oct 30, 2022   0152 Pt with difficulty handling secretions. Will get CT neck to look for PTA.  [DC]   0554 Group A Strep, Molecular(!) [PW]   0555 Group A Strep, Molecular(!): Positive [PW]   0555 Monospot: Negative [PW]   0555 SARS-CoV-2 RNA, Amplification, Qual: Negative [PW]   0555 Lactate, Zacarias: 1.1 [PW]   0555 Complete Blood Count (CBC)(!) [PW]   0555 Comprehensive Metabolic Panel (CMP)(!) [PW]   0555 I spoke with the transfer live and they said they been waiting for EENT since 3 hours from seen in reverse.  Advise them that they need to call another EENT or another hospital for transfer this patient. [PW]   0556 Patient wakes easily, throat shows bilateral enlargement, there is no stridor, moderate swelling but and we open.  Lungs clear with no wheezes rhonchi bronchospasm. [PW]   0638 Complete Blood Count (CBC)(!) [PW]   0647 Transfer line just call and set that ENT she had seen  [facility] was not on-call of for this patient.  The only ENT is at the [facility] and they will try to get him there.  Advised that patient does have the secretions better now a but still swollen the back of his throat. [PW]   0659 Patient has been accepted by ENT at main campus [facility] to the emergency room. [PW]   0855 Patient sitting up no acute distress with family member present [PW]   0920 Received a call from portal transport, I feel that patient needs to be evaluated by ENT before any plans for discharging or admit the patient is facility which is not have ENT available. [PW]   0929 CT Soft Tissue Neck With Contrast [PW]      ED Course User Index  [DC] Carl Conn Jr., MD  [PW] Richard Richards MD                 Clinical Impression:   Final diagnoses:  [J02.0] Strep pharyngitis (Primary)  [A41.9] Sepsis without acute organ dysfunction, due to unspecified organism  [J36] Peritonsillar abscess        ED Disposition Condition    Transfer to Another Facility Stable                Richard Richards MD  10/30/22 3798

## 2022-10-30 NOTE — CONSULTS
[facility] Emergency Dept  Otorhinolaryngology-Head & Neck Surgery  Consult Note    Patient Name: Ambreen Campbell  MRN: [id number]  Code Status: Full Code  Admission Date: 10/30/2022  Hospital Length of Stay: 0 days  Attending Physician: Tika Cast MD  Primary Care Provider: Primary Doctor No    Patient information was obtained from patient and ER records.     Inpatient consult to ENT  Consult performed by: Jose Antonio Tilley MD  Consult ordered by: Rosales Plummer MD        Subjective:     Chief Complaint/Reason for Admission: sore throat    History of Present Illness: 32M w 3 days of sore throat, odynophagia, started on the L now worse on the R. Denies fever or chills. Able to tolerate his own secretions. Seen at OSH, reported severe sore throat and unable to tolerate PO - concern for peritonsillar abscess. Has since been treated w IV decadron and and abx and feels better. Still w pain, but able to tolerate PO. No hx of similar sxs. No trismus. No difficulty breathing.       Medications:  Continuous Infusions:  Scheduled Meds:  PRN Meds:     Current Facility-Administered Medications on File Prior to Encounter   Medication    [COMPLETED] 0.9%  NaCl infusion    [COMPLETED] cefTRIAXone (ROCEPHIN) 2 g/50 mL D5W IVPB    [COMPLETED] dexAMETHasone sodium phos (PF) injection 10 mg    [COMPLETED] iohexoL (OMNIPAQUE 350) injection 100 mL    [COMPLETED] ketorolac injection 30 mg    [COMPLETED] sodium chloride 0.9% bolus 1,000 mL    [DISCONTINUED] nicotine 21 mg/24 hr 1 patch     Current Outpatient Medications on File Prior to Encounter   Medication Sig    erythromycin (ROMYCIN) ophthalmic ointment Place a 1/2 inch ribbon of ointment into the lower eyelid.    ondansetron (ZOFRAN) 4 MG tablet Take 1 tablet (4 mg total) by mouth every 8 (eight) hours as needed for Nausea.    promethazine (PHENERGAN) 25 MG tablet Take 2 tablets (50 mg total) by mouth every 6 (six) hours as needed for Nausea.       Review of  patient's allergies indicates:  No Known Allergies    No past medical history on file.  Past Surgical History:   Procedure Laterality Date    FOOT SURGERY Left      Family History    None       Tobacco Use    Smoking status: Every Day     Packs/day: 0.50     Types: Cigarettes    Smokeless tobacco: Never   Substance and Sexual Activity    Alcohol use: Yes     Comment: occ    Drug use: Not Currently     Comment: Opiates    Sexual activity: Yes     Review of Systems   Constitutional:  Negative for chills, fever and unexpected weight change.   HENT:  Positive for sore throat and trouble swallowing. Negative for congestion, ear discharge, ear pain, hearing loss, nosebleeds and voice change.    Eyes:  Negative for pain and visual disturbance.   Respiratory:  Negative for cough, shortness of breath and stridor.    Cardiovascular:  Negative for chest pain.   Gastrointestinal:  Negative for abdominal pain, nausea and vomiting.   Skin:  Negative for rash and wound.   Objective:     Vital Signs (Most Recent):  Temp: 98 °F (36.7 °C) (10/30/22 1413)  Pulse: 110 (10/30/22 1503)  Resp: 18 (10/30/22 1413)  BP: (!) 144/66 (10/30/22 1433)  SpO2: 96 % (10/30/22 1433)   Vital Signs (24h Range):  Temp:  [98 °F (36.7 °C)-99.9 °F (37.7 °C)] 98 °F (36.7 °C)  Pulse:  [] 110  Resp:  [18-22] 18  SpO2:  [95 %-100 %] 96 %  BP: (110-166)/(62-97) 144/66     Weight: 109 kg (240 lb 4.8 oz)  Body mass index is 31.7 kg/m².        Physical Exam  Vitals reviewed.   Constitutional:       General: He is not in acute distress.     Appearance: Normal appearance. He is normal weight. He is not ill-appearing or toxic-appearing.   HENT:      Head: Normocephalic and atraumatic.      Jaw: No trismus, tenderness or malocclusion.      Salivary Glands: Right salivary gland is not diffusely enlarged or tender. Left salivary gland is not diffusely enlarged or tender.      Right Ear: Hearing and external ear normal. No decreased hearing noted. No  drainage.      Left Ear: Hearing and external ear normal. No decreased hearing noted. No drainage.      Nose: Nose normal. No nasal deformity, signs of injury, congestion or rhinorrhea.      Right Nostril: No epistaxis.      Left Nostril: No epistaxis.      Mouth/Throat:      Lips: Pink. No lesions.      Mouth: Mucous membranes are moist. No oral lesions or angioedema.      Dentition: No gum lesions.      Tongue: No lesions. Tongue does not deviate from midline.      Palate: No mass and lesions.      Pharynx: Oropharynx is clear. Uvula midline. Posterior oropharyngeal erythema present. No pharyngeal swelling.      Tonsils: No tonsillar exudate or tonsillar abscesses.      Comments: Tonsillar erythema, 4+ bilat, R slightly larger  Mild palatal edema  Tonsils are indurated, no fluctuance palpable, tender  No exudate  Eyes:      General: Lids are normal.         Right eye: No discharge.         Left eye: No discharge.      Extraocular Movements: Extraocular movements intact.      Conjunctiva/sclera: Conjunctivae normal.      Pupils: Pupils are equal, round, and reactive to light.   Neck:      Thyroid: No thyroid mass or thyromegaly.      Trachea: Trachea and phonation normal.   Cardiovascular:      Rate and Rhythm: Normal rate.   Pulmonary:      Effort: Pulmonary effort is normal. No accessory muscle usage, respiratory distress or retractions.      Breath sounds: No stridor. No wheezing.   Musculoskeletal:      Cervical back: Normal range of motion and neck supple. Tenderness present. No muscular tenderness.   Lymphadenopathy:      Cervical: Cervical adenopathy (R) present.   Neurological:      Mental Status: He is alert and oriented to person, place, and time.      Cranial Nerves: No facial asymmetry.       Significant Labs:  BMP:   Recent Labs   Lab 10/30/22  0610   *   CL 99   CO2 25   BUN 10   CREATININE 0.8   CALCIUM 9.7     CBC:   Recent Labs   Lab 10/30/22  0610   WBC 19.57*   RBC 4.95   HGB 14.2   HCT  41.6      MCV 84   MCH 28.7   MCHC 34.1     Microbiology Results (last 7 days)       ** No results found for the last 168 hours. **            Significant Diagnostics:  CT: I have reviewed all pertinent results/findings within the past 24 hours and my personal findings are:  peritonsillar phegmon on the R, no drainable abscess      Assessment/Plan:     * Tonsillitis  Sepsis secondary to pharyngitis. No drainable collection on exam or CT.    - No acute ENT intervention at this time  - Recommend admission to hospital medicine for sepsis 2/2 pharyngitis mgmt, IV abx  - Okay for diet from ENT perspective  - Please page w questions      VTE Risk Mitigation (From admission, onward)         Ordered     IP VTE HIGH RISK PATIENT  Once         10/30/22 1522     Place sequential compression device  Until discontinued         10/30/22 1522                Thank you for your consult. I will follow-up with patient. Please contact us if you have any additional questions.    Jose Antonio Tilley MD  Otorhinolaryngology-Head & Neck Surgery  [facility] - Emergency Dept

## 2022-10-30 NOTE — ED NOTES
Pt is set up with suction. He states that he is unable to swallow his own secretions without significant pain and he would prefer to just suction it.

## 2022-10-30 NOTE — ED NOTES
Pt is no longer suctioning, he is able to swallow now and reports feeling a lot better. He suctioned 400ml total secretions.

## 2022-10-30 NOTE — ED PROVIDER NOTES
Encounter Date: 10/30/2022       History     Chief Complaint   Patient presents with    Lymphadenopathy     In throat x 3 days.        Pt here with c/o ST and difficult swallowing the past 2days, worse the past few hours. Unable to swallow. Fever started last night. No difficulty breathing. No sick contacts. +pain in his right ear.      Sore Throat   This is a new problem. The sore throat symptoms include difficulty swallowing, sore throat and a foreign body sensation.The current episode started two days ago. The problem has been gradually worsening. The pain is worse on the right side. The pain is at a severity of 7/10. Associated symptoms include drooling, ear pain, a hoarse voice, neck pain, swollen glands and trouble swallowing. Pertinent negatives include no abdominal pain, congestion, coughing, diarrhea, ear discharge, headaches, plugged ear sensation, shortness of breath, stridor or vomiting. He has had no exposure to strep or mono. He has tried nothing for the symptoms.   Review of patient's allergies indicates:  No Known Allergies  No past medical history on file.  Past Surgical History:   Procedure Laterality Date    FOOT SURGERY Left      No family history on file.  Social History     Tobacco Use    Smoking status: Every Day     Packs/day: 0.50     Types: Cigarettes    Smokeless tobacco: Never   Substance Use Topics    Alcohol use: Yes     Comment: occ    Drug use: Not Currently     Comment: Opiates     Review of Systems   Constitutional:  Positive for chills and fever.   HENT:  Positive for drooling, ear pain, hoarse voice, sore throat, trouble swallowing and voice change. Negative for congestion and ear discharge.    Respiratory:  Negative for cough, shortness of breath and stridor.    Gastrointestinal:  Negative for abdominal pain, diarrhea and vomiting.   Musculoskeletal:  Positive for neck pain.   Skin:  Negative for rash.   Neurological:  Negative for headaches.   All other systems reviewed and are  negative.    Physical Exam     Initial Vitals [10/30/22 0125]   BP Pulse Resp Temp SpO2   129/86 (!) 132 (!) 22 99.1 °F (37.3 °C) 99 %      MAP       --         Physical Exam    Nursing note and vitals reviewed.  Constitutional: He appears well-developed and well-nourished.   Uncomfortable, nontoxic   HENT:   Mouth/Throat: Oropharyngeal exudate present.   Fullness to voice, no trismus. Mod swelling and erythema. Uvula midline. Graham TM normal. +pooling secretions.   Eyes: Conjunctivae and EOM are normal.   Neck: Neck supple. No thyromegaly present. No tracheal deviation present.   Normal range of motion.  Cardiovascular:  Regular rhythm, normal heart sounds and intact distal pulses.           tachy   Pulmonary/Chest: Breath sounds normal. No stridor.   Abdominal: Abdomen is soft. There is no abdominal tenderness.   Musculoskeletal:         General: No tenderness or edema. Normal range of motion.      Cervical back: Normal range of motion and neck supple.     Lymphadenopathy:     He has cervical adenopathy.   Neurological: He is alert and oriented to person, place, and time.   Skin: Skin is warm and dry. Capillary refill takes less than 2 seconds. No rash noted. No erythema. No pallor.       ED Course   Procedures  Labs Reviewed   GROUP A STREP, MOLECULAR - Abnormal; Notable for the following components:       Result Value    Group A Strep, Molecular Positive (*)     All other components within normal limits   CBC W/ AUTO DIFFERENTIAL - Abnormal; Notable for the following components:    WBC 18.64 (*)     Immature Granulocytes 1.7 (*)     Gran # (ANC) 13.3 (*)     Immature Grans (Abs) 0.32 (*)     Mono # 2.2 (*)     Lymph % 13.5 (*)     All other components within normal limits   COMPREHENSIVE METABOLIC PANEL - Abnormal; Notable for the following components:    Sodium 134 (*)     All other components within normal limits   BASIC METABOLIC PANEL - Abnormal; Notable for the following components:    Glucose 151 (*)     All  other components within normal limits   CBC W/ AUTO DIFFERENTIAL - Abnormal; Notable for the following components:    WBC 19.57 (*)     MPV 8.9 (*)     Immature Granulocytes 0.9 (*)     Gran # (ANC) 17.7 (*)     Immature Grans (Abs) 0.17 (*)     Gran % 90.4 (*)     Lymph % 6.4 (*)     Mono % 1.9 (*)     All other components within normal limits   CULTURE, BLOOD   CULTURE, BLOOD   HETEROPHILE AB SCREEN   SARS-COV-2 RNA AMPLIFICATION, QUAL    Narrative:     Is the patient symptomatic?->No   LACTIC ACID, PLASMA          Imaging Results              CT Soft Tissue Neck With Contrast (Final result)  Result time 10/30/22 09:19:59      Final result by Shasha Tello MD (10/30/22 09:19:59)                   Impression:      Right-sided tonsillitis with findings concerning for developing tonsillar abscess tracking inferiorly toward the floor of the mouth.  Cervical adenopathy, likely reactive.  Sinusitis.      Electronically signed by: Shasha Tello  Date:    10/30/2022  Time:    09:19               Narrative:    EXAMINATION:  CT SOFT TISSUE NECK WITH CONTRAST    CLINICAL HISTORY:  Neck abscess, deep tissue;right submandibular pain    TECHNIQUE:  Low dose axial images as well as sagittal and coronal reconstructions were performed from the skull base to the clavicles following the intravenous administration of 100 mL of Omnipaque 350.    COMPARISON:  None    FINDINGS:  No intracranial abnormalities are noted.    The orbits, nasopharynx are normal, aside from mild prominence of the adenoidal soft tissues.    There is enlargement of the right oropharyngeal tonsil.  There is an ill-defined central area of decreased attenuation concerning for developing abscess within it;  this measures 19 x 15 x 20 mm and does not have well-defined margins.  It extends toward the floor of the mouth as seen on series 2, image 47, adjacent to the submandibular gland.  Submandibular gland is not enlarged, and appears symmetrical to the  contralateral side.    There is an enlarged right submandibular lymph node with a short axis diameter of 14 mm on series 2, image 58, likely reactive.  There is an enlarged level 2 left IJ lymph node with a short axis diameter of 15 mm on series 2, image 50.    The epiglottis, larynx, thyroid gland, superior mediastinum and visualized portions of the lungs are normal.    There is mucosal thickening throughout the frontal sinus and bilateral and anterior ethmoid air cells.    No acute findings noted in the bones.                        ED Interpretation by Richard Richards MD (10/30/22 05:55:50, [facility], Emergency Medicine)    CT of the neck shows a right tonsillar abscess with ill-defined disc the 16 x 10 x 22 mm and a right submandibular adenopathy                                     Medications   sodium chloride 0.9% bolus 1,000 mL (0 mLs Intravenous Stopped 10/30/22 0247)   dexAMETHasone sodium phos (PF) injection 10 mg (10 mg Intravenous Given 10/30/22 0131)   cefTRIAXone (ROCEPHIN) 2 g/50 mL D5W IVPB (0 g Intravenous Stopped 10/30/22 0346)   iohexoL (OMNIPAQUE 350) injection 100 mL (100 mLs Intravenous Given 10/30/22 0231)   ketorolac injection 30 mg (30 mg Intravenous Given 10/30/22 0258)   0.9%  NaCl infusion (1,000 mLs Intravenous New Bag 10/30/22 0614)     Medical Decision Making:   Differential Diagnosis:   Strep, mono, PTA, angioedema  Clinical Tests:   Lab Tests: Ordered and Reviewed  Radiological Study: Ordered and Reviewed  ED Management:  Pt presented with sore throat. Muffled voice and difficulty handling secretions. No stridor. Exam c/w strep with possible PTA on right. CT performed and showed developing abscess with submandibular adenopathy. He had leukocytosis of 18k. Cxs and lactate sent. Lactate was neg. Pt given 1L NS and 2g Rocephin along with 10mg decadron. Covid and mono neg. Pt to be transferred for ENT consult.            ED Course as of 10/30/22 1751   Sun Oct 30, 2022    0152 Pt with difficulty handling secretions. Will get CT neck to look for PTA.  [DC]   0554 Group A Strep, Molecular(!) [PW]   0555 Group A Strep, Molecular(!): Positive [PW]   0555 Monospot: Negative [PW]   0555 SARS-CoV-2 RNA, Amplification, Qual: Negative [PW]   0555 Lactate, Zacarias: 1.1 [PW]   0555 Complete Blood Count (CBC)(!) [PW]   0555 Comprehensive Metabolic Panel (CMP)(!) [PW]   0555 I spoke with the transfer live and they said they been waiting for EENT since 3 hours from seen in reverse.  Advise them that they need to call another EENT or another hospital for transfer this patient. [PW]   0556 Patient wakes easily, throat shows bilateral enlargement, there is no stridor, moderate swelling but and we open.  Lungs clear with no wheezes rhonchi bronchospasm. [PW]   0638 Complete Blood Count (CBC)(!) [PW]   0647 Transfer line just call and set that ENT she had seen [facility] was not on-call of for this patient.  The only ENT is at the [facility] and they will try to get him there.  Advised that patient does have the secretions better now a but still swollen the back of his throat. [PW]   0659 Patient has been accepted by ENT at main campus [facility] to the emergency room. [PW]   0855 Patient sitting up no acute distress with family member present [PW]   0920 Received a call from portal transport, I feel that patient needs to be evaluated by ENT before any plans for discharging or admit the patient is facility which is not have ENT available. [PW]   0929 CT Soft Tissue Neck With Contrast [PW]      ED Course User Index  [DC] Carl Conn Jr., MD  [PW] Richard Richards MD                 Clinical Impression:   Final diagnoses:  [J02.0] Strep pharyngitis (Primary)  [A41.9] Sepsis without acute organ dysfunction, due to unspecified organism  [J36] Peritonsillar abscess      ED Disposition Condition    Transfer to Another Facility Stable                Carl Conn Jr., MD  10/30/22 9634       Carl Conn Jr.,  MD  10/30/22 4924

## 2022-10-30 NOTE — ED NOTES
Patient identifiers for Ambreen Campbell 32 y.o. male checked and correct.  Chief Complaint   Patient presents with    [facility]      [facility] transfer for ENT, peritonsillar abscess, Airway patent, no respiratory distress noted. Pain 4 out of 10.      No past medical history on file.  Allergies reported: Review of patient's allergies indicates:  No Known Allergies      LOC: Patient is awake, alert, and aware of environment with an appropriate affect. Patient is oriented x 4 and speaking appropriately.  APPEARANCE: Patient resting comfortably and in no acute distress. Patient is clean and well groomed, patient's clothing is properly fastened.  HEENT: peritonsillar abscess. C/o sore throat 4/10. Hoarse voice noted, maintaining secretions without difficulty  SKIN: The skin is warm and dry. Patient has normal skin turgor and moist mucus membranes.   MUSKULOSKELETAL: Patient is moving all extremities well, no obvious deformities noted. Pulses intact.   RESPIRATORY: Airway is open and patent. Respirations are spontaneous and non-labored with normal effort and rate. Denies SOB  CARDIAC: Patient has a normal rhythm. Sinus tach on cardiac monitor. No peripheral edema noted.   ABDOMEN: No distention noted. Soft and non-tender upon palpation.  NEUROLOGICAL: pupils 3mm, PERRL. Facial expression is symmetrical. Hand grasps are equal bilaterally. Normal sensation in all extremities when touched with finger.

## 2022-10-30 NOTE — H&P
History and Physical  Hospital Medicine       Patient Name: Ambreen Campbell  MRN:  [id number]  [facility] Medicine Team: [facility] MED K Tika Cast MD  Date of Admission:  10/30/2022     Principal Problem:  Tonsillitis   Primary Care Physician: Primary Doctor No      History of Present Illness:     Mr. Ambreen Campbell is a 32 y.o. male with nomedical history who reports 3 days of throat pain and difficulty swallowing that progressed through these days to where he could not eat food at home and was worse than any prev tonsillitis he has had in the past. He went to OSH ER with concerns for abscess and was found to have strep pharyngitis and developing possible abscess with reacive adenopathy and sinusitis seen on imaging. He denies major sinusitiis symptoms, no stuffy nose, post nasal drip or point tenderness in maxillary sinuses. He has redness on right tonsil area he has noticed in the last 3 days. At OSH he got toradol, antitbiotics and decadron and started to feela lot better. He still has pain swallowing more on right and tender lymph nodes in neck to palpation. He tolerated applesauce in the ER. Discused liquid diets and advancing PRN. He was seen by ENT in the ER who recommended strep pharyngitis tx, they did not comment or rec steroids at this time    Med hx none    Mediations at home none    Smoker.        Review of Systems   Constitutional: Negative for chills, + fatigue, fever.   HENT: + for sore throat, trouble swallowing.    Eyes: Negative for photophobia, visual disturbance.   Respiratory: Negative for cough, shortness of breath.    Cardiovascular: Negative for chest pain, palpitations, leg swelling.   Gastrointestinal: Negative for abdominal pain, constipation, diarrhea, nausea, vomiting.   Endocrine: Negative for cold intolerance, heat intolerance.   Genitourinary: Negative for dysuria, frequency.   Musculoskeletal: Negative for arthralgias, myalgias.   Skin: Negative for rash, wound, erythema   Neurological:  Negative for dizziness, syncope, weakness, light-headedness.   Psychiatric/Behavioral: Negative for confusion, hallucinations, anxiety  All other systems reviewed and are negative.      Past Medical History: Patient has no past medical history on file.    Past Surgical History: Patient has a past surgical history that includes Foot surgery (Left).    Social History: Patient reports that he has been smoking cigarettes. He has been smoking an average of .5 packs per day. He has never used smokeless tobacco. He reports current alcohol use. He reports that he does not currently use drugs.    Family History: family history is not on file.    Medications: Scheduled Meds:   ampicillin-sulbactim (UNASYN) IVPB  1.5 g Intravenous Q6H    [START ON 10/31/2022] cetirizine  5 mg Oral Daily    [START ON 10/31/2022] fluticasone propionate  2 spray Each Nostril Daily    ketorolac  15 mg Intravenous Q6H    nicotine  1 patch Transdermal Daily     Continuous Infusions:   sodium chloride 0.9%       PRN Meds:.acetaminophen, albuterol, dextrose 10%, dextrose 10%, glucagon (human recombinant), glucose, glucose, HYDROcodone-acetaminophen, melatonin, morphine, naloxone, sodium chloride 0.9%, sodium chloride 0.9%    Allergies: Patient has No Known Allergies.    Physical Exam:     Vital Signs (Most Recent):  Temp: 98 °F (36.7 °C) (10/30/22 1413)  Pulse: 110 (10/30/22 1503)  Resp: 18 (10/30/22 1413)  BP: (!) 144/66 (10/30/22 1433)  SpO2: 96 % (10/30/22 1433)   Vital Signs Range (Last 24H):  Temp:  [98 °F (36.7 °C)-99.9 °F (37.7 °C)]   Pulse:  []   Resp:  [18-22]   BP: (110-166)/(62-97)   SpO2:  [95 %-100 %]    Body mass index is 31.7 kg/m².     Physical Exam:  Constitutional: Appears well-developed and well-nourished.   Head: Normocephalic and atraumatic.   Mouth/Throat: oropharynx with swelling on right side and redness to tonsil. Able to swallow secretions.  Eyes: EOM are normal. Pupils are equal, round, and reactive to light. No  scleral icterus.   Neck: Normal range of motion. Neck supple. Some mild pain with palpation of neck with some lymph   Cardiovascular: Normal rate and regular rhythm.  No murmur heard.nodes felt submandibular L and R enlarged.  Pulmonary/Chest: Effort normal and breath sounds normal. No respiratory distress. No wheezes, rales, or rhonchi. No resp distress on RA  Abdominal: Soft. Bowel sounds are normal.  No distension or tenderness  Musculoskeletal: Normal range of motion. No edema.   Neurological: Alert and oriented to person, place, and time.   Skin: Skin is warm and dry.   Psychiatric: Normal mood and affect. Behavior is normal.   Vitals reviewed.    Recent Labs   Lab 10/30/22  0137 10/30/22  0610   WBC 18.64* 19.57*   HGB 14.0 14.2   HCT 41.1 41.6    297       Recent Labs   Lab 10/30/22  0137 10/30/22  0610   * 137   K 4.1 4.7   CL 95 99   CO2 27 25   BUN 9 10   CREATININE 0.8 0.8    151*   CALCIUM 9.7 9.7     Recent Labs   Lab 10/30/22  0137   ALKPHOS 80   ALT 19   AST 15   ALBUMIN 3.7   PROT 8.3   BILITOT 0.6      No results for input(s): POCTGLUCOSE in the last 168 hours.      Assessment and Plan:     Mr. Ambreen Campbell is a 32 y.o. male who presented to [facility] on 10/30/2022 with     Strep A pharyngitis  Tonsillitis  -worsening sore throat and right sided swelling/redness, with possible early peritonsillar abscess on R on CT at OSH, sent here for ENT eval, no issues breathing, feeing better after tx at OSH  -ENT recs pharyngitis tx, started on unasyn and toradol soumya. Holding steroids as ENT did not rec, follow eam further  -liquid diet and advance as tolerates  -WBC 19, likely from infection vs steroid reactive in addition contributor  -cloraseptic spray PRN  -tylenol, norco, morphine PRN for pain control as well as toradol    Sinus tachycardia  -suspect secondary to volume depletion from poor oral itnake from above due to severe pain from infection  -IVF x 24 hours now, EKG   -improving with  IVF in ER    Tobacco abuse  -counselled for cessation, nicotine patch ordered    Sinusitis  -zyrtec and flonase  -unasyn above will cover this in addition             Diet:  liquid, advance to soft if toleraces  DVT PPx:  teds/scds   Dispo- pending symptoms improvement, ENT recs    Discharge Planning   RON:  11/1/22    Code Status: Full Code   Is the patient medically ready for discharge?:     Reason for patient still in hospital (select all that apply): Patient trending condition

## 2022-10-30 NOTE — ED NOTES
Per patient's request he was walked around the department. Patient also requesting a nicotine patch. Nurse notified. MD notified.

## 2022-10-30 NOTE — Clinical Note
Diagnosis: Pharyngitis, unspecified etiology [[id number]]   Admitting Provider:: HOMAR ARAUJO [[id number]]   Future Attending Provider: HOMAR ARAUJO [[id number]]   Reason for IP Medical Treatment  (Clinical interventions that can only be accomplished in the IP setting? ) :: pharyngitis, sepsis   Estimated Length of Stay:: 2 midnights   I certify that Inpatient services for greater than or equal to 2 midnights are medically necessary:: Yes   Plans for Post-Acute care--if anticipated (pick the single best option):: A. No post acute care anticipated at this time

## 2022-10-30 NOTE — ED NOTES
Spoke with diana KOROMA supervisor voiced concerns on delay of care and time transfer of priority 1 transfer he stated he would find out what was going on and call me back

## 2022-10-30 NOTE — ASSESSMENT & PLAN NOTE
Sepsis secondary to pharyngitis. No drainable collection on exam or CT.    - No acute ENT intervention at this time  - Recommend admission to hospital medicine for sepsis 2/2 pharyngitis mgmt, IV abx  - Okay for diet from ENT perspective  - Please page w questions

## 2022-10-30 NOTE — ED NOTES
Pt has multiple episodes of de-sating into the 70% during sleep with snoring. He is placed on 50% venti mask for sleep. Pt is also very diaphoretic. Pt's temp is checked, 99.2. This is relayed to MD Richards.

## 2022-11-04 LAB
BACTERIA BLD CULT: NORMAL
BACTERIA BLD CULT: NORMAL

## 2023-02-08 ENCOUNTER — HOSPITAL ENCOUNTER (EMERGENCY)
Facility: HOSPITAL | Age: 33
Discharge: HOME | End: 2023-02-08

## 2023-02-08 VITALS
HEART RATE: 118 BPM | HEIGHT: 73 IN | SYSTOLIC BLOOD PRESSURE: 131 MMHG | OXYGEN SATURATION: 99 % | TEMPERATURE: 99 F | DIASTOLIC BLOOD PRESSURE: 88 MMHG | WEIGHT: 240 LBS | RESPIRATION RATE: 20 BRPM | BODY MASS INDEX: 31.81 KG/M2

## 2023-02-08 DIAGNOSIS — J02.0 STREP PHARYNGITIS: Primary | ICD-10-CM

## 2023-02-08 DIAGNOSIS — J03.90 TONSILLITIS WITH EXUDATE: ICD-10-CM

## 2023-02-08 DIAGNOSIS — R59.0 CERVICAL LYMPHADENOPATHY: ICD-10-CM

## 2023-02-08 LAB — GROUP A STREP, MOLECULAR: POSITIVE

## 2023-02-08 PROCEDURE — 87651 STREP A DNA AMP PROBE: CPT | Performed by: NURSE PRACTITIONER

## 2023-02-08 PROCEDURE — 63600175 PHARM REV CODE 636 W HCPCS: Performed by: NURSE PRACTITIONER

## 2023-02-08 PROCEDURE — 96372 THER/PROPH/DIAG INJ SC/IM: CPT | Performed by: NURSE PRACTITIONER

## 2023-02-08 PROCEDURE — 86803 HEPATITIS C AB TEST: CPT | Performed by: EMERGENCY MEDICINE

## 2023-02-08 PROCEDURE — 25000003 PHARM REV CODE 250: Performed by: NURSE PRACTITIONER

## 2023-02-08 PROCEDURE — 87389 HIV-1 AG W/HIV-1&-2 AB AG IA: CPT | Performed by: EMERGENCY MEDICINE

## 2023-02-08 PROCEDURE — 99284 EMERGENCY DEPT VISIT MOD MDM: CPT

## 2023-02-08 RX ORDER — DEXAMETHASONE SODIUM PHOSPHATE 10 MG/ML
6 INJECTION INTRAMUSCULAR; INTRAVENOUS
Status: COMPLETED | OUTPATIENT
Start: 2023-02-08 | End: 2023-02-08

## 2023-02-08 RX ORDER — PREDNISONE 20 MG/1
20 TABLET ORAL DAILY
Qty: 5 TABLET | Refills: 0 | Status: SHIPPED | OUTPATIENT
Start: 2023-02-08 | End: 2023-02-08 | Stop reason: SDUPTHER

## 2023-02-08 RX ORDER — LIDOCAINE HYDROCHLORIDE 10 MG/ML
2 INJECTION, SOLUTION EPIDURAL; INFILTRATION; INTRACAUDAL; PERINEURAL
Status: COMPLETED | OUTPATIENT
Start: 2023-02-08 | End: 2023-02-08

## 2023-02-08 RX ORDER — CEFDINIR 300 MG/1
300 CAPSULE ORAL 2 TIMES DAILY
Qty: 20 CAPSULE | Refills: 0 | Status: SHIPPED | OUTPATIENT
Start: 2023-02-08 | End: 2023-02-08 | Stop reason: SDUPTHER

## 2023-02-08 RX ORDER — CEFTRIAXONE 1 G/1
1 INJECTION, POWDER, FOR SOLUTION INTRAMUSCULAR; INTRAVENOUS
Status: COMPLETED | OUTPATIENT
Start: 2023-02-08 | End: 2023-02-08

## 2023-02-08 RX ORDER — PREDNISONE 20 MG/1
20 TABLET ORAL DAILY
Qty: 5 TABLET | Refills: 0 | Status: SHIPPED | OUTPATIENT
Start: 2023-02-08

## 2023-02-08 RX ORDER — CEFDINIR 300 MG/1
300 CAPSULE ORAL 2 TIMES DAILY
Qty: 20 CAPSULE | Refills: 0 | Status: SHIPPED | OUTPATIENT
Start: 2023-02-08 | End: 2023-02-18

## 2023-02-08 RX ADMIN — CEFTRIAXONE SODIUM 1 G: 1 INJECTION, POWDER, FOR SOLUTION INTRAMUSCULAR; INTRAVENOUS at 06:02

## 2023-02-08 RX ADMIN — DEXAMETHASONE SODIUM PHOSPHATE 6 MG: 10 INJECTION INTRAMUSCULAR; INTRAVENOUS at 06:02

## 2023-02-08 RX ADMIN — LIDOCAINE HYDROCHLORIDE 20 MG: 10 INJECTION, SOLUTION EPIDURAL; INFILTRATION; INTRACAUDAL; PERINEURAL at 06:02

## 2023-02-08 NOTE — ED PROVIDER NOTES
"Encounter Date: 2/8/2023       History     Chief Complaint   Patient presents with    Sore Throat     Sore throat x 3 days     Patient is a 32-year-old male presents emergency room with reoccurring "sore throat with lymph node swelling".  This would be 3rd or 4th hospitalization/ER visit for the patient for the same complaint since October.  Patient has been evaluated by ENT in [facility] acutely in October, and again at [facility] in November, put no surgical recommendations were made at that time secondary to infection.  Patient denies any fevers, chills, nausea, vomiting or diarrhea.  He denies any shortness of breath.  Patient does have difficulty swallowing.  Patient states he does have a history of sleep apnea and loud snoring.  Patient states sore throat started 3 days ago, has progressively gotten worse.    Review of patient's allergies indicates:  No Known Allergies  History reviewed. No pertinent past medical history.  Past Surgical History:   Procedure Laterality Date    FOOT SURGERY Left      History reviewed. No pertinent family history.  Social History     Tobacco Use    Smoking status: Every Day     Packs/day: 0.50     Types: Cigarettes    Smokeless tobacco: Never   Substance Use Topics    Alcohol use: Yes     Comment: occ    Drug use: Not Currently     Comment: Opiates     Review of Systems   Constitutional: Negative.    HENT:  Positive for sore throat, trouble swallowing and voice change.    Eyes: Negative.    Respiratory: Negative.     Cardiovascular: Negative.    Gastrointestinal: Negative.    Endocrine: Negative.    Genitourinary: Negative.    Musculoskeletal: Negative.    Skin: Negative.    Allergic/Immunologic: Negative for food allergies.   Neurological: Negative.    Hematological: Negative.    Psychiatric/Behavioral: Negative.     All other systems reviewed and are negative.    Physical Exam     Initial Vitals [02/08/23 1728]   BP Pulse Resp Temp SpO2   131/88 (!) 118 20 99.1 °F (37.3 °C) 99 % "      MAP       --         Physical Exam    Nursing note and vitals reviewed.  Constitutional: He appears well-developed and well-nourished. He is not diaphoretic. No distress.   HENT:   Head: Normocephalic and atraumatic.   Right Ear: Tympanic membrane normal.   Left Ear: Tympanic membrane normal.   Mouth/Throat: Oropharyngeal exudate (tonsillar exudate), posterior oropharyngeal edema and posterior oropharyngeal erythema present.   Tonsils are significantly swollen in the proximally 3+.   Eyes: Conjunctivae and EOM are normal. Pupils are equal, round, and reactive to light. No scleral icterus.   Neck: No thyromegaly present.   Normal range of motion.  Cardiovascular:  Normal rate, regular rhythm, normal heart sounds and intact distal pulses.     Exam reveals no friction rub.       No murmur heard.  Pulmonary/Chest: Breath sounds normal. No respiratory distress. He has no wheezes. He has no rhonchi. He has no rales. He exhibits no tenderness.   Abdominal: Abdomen is soft. Bowel sounds are normal.   Musculoskeletal:         General: No edema. Normal range of motion.      Cervical back: Normal range of motion.     Lymphadenopathy:     He has cervical adenopathy.   Neurological: He is oriented to person, place, and time. He has normal strength. No sensory deficit. GCS score is 15. GCS eye subscore is 4. GCS verbal subscore is 5. GCS motor subscore is 6.   Skin: Skin is warm and dry. Capillary refill takes 2 to 3 seconds. No rash noted.   Psychiatric: He has a normal mood and affect.       ED Course   Procedures  Labs Reviewed   GROUP A STREP, MOLECULAR - Abnormal; Notable for the following components:       Result Value    Group A Strep, Molecular Positive (*)     All other components within normal limits   HIV 1 / 2 ANTIBODY   HEPATITIS C ANTIBODY          Imaging Results    None          Medications   dexAMETHasone sodium phos (PF) injection 6 mg (6 mg Intramuscular Given 2/8/23 3360)   cefTRIAXone injection 1 g (1 g  Intramuscular Given 2/8/23 1806)   LIDOcaine (PF) 10 mg/ml (1%) injection 20 mg (20 mg Infiltration Given 2/8/23 1806)     Medical Decision Making:   Initial Assessment:   Patient seen examined emergency room, no acute distress noted at this time.  Patient does have significant cervical lymph node swelling, as well as enlarged and inflamed appearing tonsils.  Detailed assessment as noted above.  Differential Diagnosis:   Tonsillitis, strep, viral pharyngitis, viral URI, COVID, flu  Clinical Tests:   Lab Tests: Ordered and Reviewed       <> Summary of Lab: Strep positive  ED Management:  After patient was examined, will check patient for strep.  Go ahead give the patient 6 mg of Decadron to help reduce swelling, and 1 g IM Rocephin.    Strep test was positive.  Will discharge patient with p.o. prednisone as well as cefdinir.  Will make a ENT referral for patient as he possibly needs to have a tonsillectomy for reoccurring strep pharyngitis, as well as snoring.  Patient verbalized understanding of treatment plan.  Advised to follow up in 3-5 days if no improvement with primary care provider.  Return emergency room if he has any worsening symptoms.                        Clinical Impression:   Final diagnoses:  [J03.90] Tonsillitis with exudate  [R59.0] Cervical lymphadenopathy  [J02.0] Strep pharyngitis (Primary)        ED Disposition Condition    Discharge Stable          ED Prescriptions       Medication Sig Dispense Start Date End Date Auth. Provider    predniSONE (DELTASONE) 20 MG tablet Take 1 tablet (20 mg total) by mouth once daily. 5 tablet 2/8/2023 -- Matt Reid NP    cefdinir (OMNICEF) 300 MG capsule Take 1 capsule (300 mg total) by mouth 2 (two) times daily. for 10 days 20 capsule 2/8/2023 2/18/2023 Matt Reid NP          Follow-up Information       Follow up With Specialties Details Why Contact Info    Corky Crystal MD Otolaryngology   [address] [place] [address] [place]  [id number]  [phone]               Matt Reid, YOVANY  02/08/23 182

## 2023-02-08 NOTE — Clinical Note
"Ambreen Thomas" Adrian was seen and treated in our emergency department on 2/8/2023.  He may return to work on 02/10/2023.       If you have any questions or concerns, please don't hesitate to call.      Matt Reid NP"
EKG completed

## 2023-02-09 LAB
HCV AB SERPL QL IA: NORMAL
HIV 1+2 AB+HIV1 P24 AG SERPL QL IA: NORMAL

## 2023-02-09 NOTE — DISCHARGE INSTRUCTIONS
Take antibiotics as prescribed, be sure to finish antibiotics.    Drink plenty of fluids stay hydrated.    Follow-up primary care provider no improvement 3-5 days.    Follow up with ENT as soon as appointment can be made, has a tonsillectomy may be warranted.    Return emergency room if symptoms worsen, you develop any signs symptoms shortness breath or worsening condition.

## 2024-02-19 ENCOUNTER — HOSPITAL ENCOUNTER (EMERGENCY)
Age: 34
Discharge: HOME | End: 2024-02-19
Attending: EMERGENCY MEDICINE

## 2024-02-19 VITALS
OXYGEN SATURATION: 98 % | RESPIRATION RATE: 16 BRPM | TEMPERATURE: 98.4 F | HEART RATE: 100 BPM | SYSTOLIC BLOOD PRESSURE: 128 MMHG | DIASTOLIC BLOOD PRESSURE: 63 MMHG

## 2024-02-19 DIAGNOSIS — L02.414 ABSCESS OF LEFT ARM: Primary | ICD-10-CM

## 2024-02-19 PROCEDURE — 10002800 HB RX 250 W HCPCS: Performed by: STUDENT IN AN ORGANIZED HEALTH CARE EDUCATION/TRAINING PROGRAM

## 2024-02-19 PROCEDURE — 99282 EMERGENCY DEPT VISIT SF MDM: CPT

## 2024-02-19 PROCEDURE — 96375 TX/PRO/DX INJ NEW DRUG ADDON: CPT

## 2024-02-19 PROCEDURE — 96365 THER/PROPH/DIAG IV INF INIT: CPT

## 2024-02-19 PROCEDURE — 10002801 HB RX 250 W/O HCPCS: Performed by: STUDENT IN AN ORGANIZED HEALTH CARE EDUCATION/TRAINING PROGRAM

## 2024-02-19 PROCEDURE — 87147 CULTURE TYPE IMMUNOLOGIC: CPT | Performed by: STUDENT IN AN ORGANIZED HEALTH CARE EDUCATION/TRAINING PROGRAM

## 2024-02-19 PROCEDURE — 10002801 HB RX 250 W/O HCPCS

## 2024-02-19 PROCEDURE — 10002807 HB RX 258: Performed by: STUDENT IN AN ORGANIZED HEALTH CARE EDUCATION/TRAINING PROGRAM

## 2024-02-19 PROCEDURE — 10002519 INCISION AND DRAINAGE: Performed by: STUDENT IN AN ORGANIZED HEALTH CARE EDUCATION/TRAINING PROGRAM

## 2024-02-19 RX ORDER — DOXYCYCLINE 100 MG/1
100 TABLET ORAL 2 TIMES DAILY
Qty: 20 TABLET | Refills: 0 | Status: SHIPPED | OUTPATIENT
Start: 2024-02-19 | End: 2024-02-29

## 2024-02-19 RX ORDER — LIDOCAINE HYDROCHLORIDE AND EPINEPHRINE 10; 10 MG/ML; UG/ML
1 INJECTION, SOLUTION INFILTRATION; PERINEURAL ONCE
Status: COMPLETED | OUTPATIENT
Start: 2024-02-19 | End: 2024-02-19

## 2024-02-19 RX ORDER — CEFADROXIL 500 MG/1
500 CAPSULE ORAL 2 TIMES DAILY
Qty: 20 CAPSULE | Refills: 0 | Status: SHIPPED | OUTPATIENT
Start: 2024-02-19 | End: 2024-02-29

## 2024-02-19 RX ORDER — CEFAZOLIN SODIUM/WATER 2 G/20 ML
2000 SYRINGE (ML) INTRAVENOUS ONCE
Status: COMPLETED | OUTPATIENT
Start: 2024-02-19 | End: 2024-02-19

## 2024-02-19 RX ORDER — LIDOCAINE HYDROCHLORIDE AND EPINEPHRINE 10; 10 MG/ML; UG/ML
INJECTION, SOLUTION INFILTRATION; PERINEURAL
Status: COMPLETED
Start: 2024-02-19 | End: 2024-02-19

## 2024-02-19 RX ADMIN — DOXYCYCLINE 100 MG: 100 INJECTION, POWDER, LYOPHILIZED, FOR SOLUTION INTRAVENOUS at 14:32

## 2024-02-19 RX ADMIN — LIDOCAINE HYDROCHLORIDE,EPINEPHRINE BITARTRATE 10 ML: 10; .01 INJECTION, SOLUTION INFILTRATION; PERINEURAL at 14:59

## 2024-02-19 RX ADMIN — CEFTRIAXONE SODIUM 2000 MG: 10 INJECTION, POWDER, FOR SOLUTION INTRAVENOUS at 14:23

## 2024-02-19 RX ADMIN — LIDOCAINE HYDROCHLORIDE AND EPINEPHRINE 10 ML: 10; 10 INJECTION, SOLUTION INFILTRATION; PERINEURAL at 14:59

## 2024-02-19 SDOH — SOCIAL STABILITY: SOCIAL INSECURITY: HOW OFTEN DOES ANYONE, INCLUDING FAMILY AND FRIENDS, THREATEN YOU WITH HARM?: NEVER

## 2024-02-19 SDOH — SOCIAL STABILITY: SOCIAL INSECURITY: HOW OFTEN DOES ANYONE, INCLUDING FAMILY AND FRIENDS, INSULT OR TALK DOWN TO YOU?: NEVER

## 2024-02-19 SDOH — SOCIAL STABILITY: SOCIAL INSECURITY: HOW OFTEN DOES ANYONE, INCLUDING FAMILY AND FRIENDS, SCREAM OR CURSE AT YOU?: NEVER

## 2024-02-19 SDOH — SOCIAL STABILITY: SOCIAL INSECURITY: HOW OFTEN DOES ANYONE, INCLUDING FAMILY AND FRIENDS, PHYSICALLY HURT YOU?: NEVER

## 2024-02-19 ASSESSMENT — ENCOUNTER SYMPTOMS
SHORTNESS OF BREATH: 0
NAUSEA: 0
HEMATOLOGIC/LYMPHATIC NEGATIVE: 1
NUMBNESS: 0
COUGH: 0
HEADACHES: 0
CHILLS: 0
PSYCHIATRIC NEGATIVE: 1
DIARRHEA: 0
FEVER: 0
WEAKNESS: 0
VOMITING: 0
DIZZINESS: 0
ABDOMINAL PAIN: 0
BRUISES/BLEEDS EASILY: 0

## 2024-02-19 ASSESSMENT — PAIN SCALES - GENERAL
PAINLEVEL_OUTOF10: 6
PAINLEVEL_OUTOF10: 4

## 2024-02-22 LAB
BACTERIA SPEC AEROBE CULT: ABNORMAL
GRAM STN SPEC: ABNORMAL

## 2024-03-07 ENCOUNTER — TELEPHONE (OUTPATIENT)
Dept: EMERGENCY MEDICINE | Age: 34
End: 2024-03-07

## 2024-03-18 ENCOUNTER — HOSPITAL ENCOUNTER (EMERGENCY)
Age: 34
Discharge: HOME | End: 2024-03-18

## 2024-03-18 VITALS
TEMPERATURE: 99.5 F | OXYGEN SATURATION: 98 % | HEART RATE: 99 BPM | DIASTOLIC BLOOD PRESSURE: 63 MMHG | SYSTOLIC BLOOD PRESSURE: 130 MMHG | RESPIRATION RATE: 16 BRPM

## 2024-03-18 DIAGNOSIS — J02.0 STREP PHARYNGITIS: Primary | ICD-10-CM

## 2024-03-18 LAB — S PYO DNA THROAT QL NAA+PROBE: DETECTED

## 2024-03-18 PROCEDURE — 10002800 HB RX 250 W HCPCS

## 2024-03-18 PROCEDURE — 99282 EMERGENCY DEPT VISIT SF MDM: CPT

## 2024-03-18 PROCEDURE — 87651 STREP A DNA AMP PROBE: CPT | Performed by: EMERGENCY MEDICINE

## 2024-03-18 PROCEDURE — 99283 EMERGENCY DEPT VISIT LOW MDM: CPT

## 2024-03-18 RX ORDER — AMOXICILLIN 500 MG/1
500 CAPSULE ORAL 2 TIMES DAILY
Qty: 20 CAPSULE | Refills: 0 | Status: SHIPPED | OUTPATIENT
Start: 2024-03-18 | End: 2024-03-28

## 2024-03-18 RX ORDER — DEXAMETHASONE SODIUM PHOSPHATE 4 MG/ML
10 INJECTION, SOLUTION INTRA-ARTICULAR; INTRALESIONAL; INTRAMUSCULAR; INTRAVENOUS; SOFT TISSUE ONCE
Status: COMPLETED | OUTPATIENT
Start: 2024-03-18 | End: 2024-03-18

## 2024-03-18 RX ADMIN — DEXAMETHASONE SODIUM PHOSPHATE 10 MG: 4 INJECTION, SOLUTION INTRAMUSCULAR; INTRAVENOUS at 21:41

## 2024-03-18 SDOH — SOCIAL STABILITY: SOCIAL INSECURITY: HOW OFTEN DOES ANYONE, INCLUDING FAMILY AND FRIENDS, THREATEN YOU WITH HARM?: NEVER

## 2024-03-18 SDOH — SOCIAL STABILITY: SOCIAL INSECURITY: HOW OFTEN DOES ANYONE, INCLUDING FAMILY AND FRIENDS, PHYSICALLY HURT YOU?: NEVER

## 2024-03-18 SDOH — SOCIAL STABILITY: SOCIAL INSECURITY: HOW OFTEN DOES ANYONE, INCLUDING FAMILY AND FRIENDS, SCREAM OR CURSE AT YOU?: NEVER

## 2024-03-18 SDOH — SOCIAL STABILITY: SOCIAL INSECURITY: HOW OFTEN DOES ANYONE, INCLUDING FAMILY AND FRIENDS, INSULT OR TALK DOWN TO YOU?: NEVER

## 2024-03-18 ASSESSMENT — PAIN SCALES - GENERAL: PAINLEVEL_OUTOF10: 5

## 2024-03-20 ENCOUNTER — HOSPITAL ENCOUNTER (EMERGENCY)
Age: 34
Discharge: HOME | End: 2024-03-20

## 2024-03-20 VITALS
HEART RATE: 112 BPM | SYSTOLIC BLOOD PRESSURE: 130 MMHG | OXYGEN SATURATION: 99 % | RESPIRATION RATE: 16 BRPM | TEMPERATURE: 99.2 F | DIASTOLIC BLOOD PRESSURE: 69 MMHG

## 2024-03-20 DIAGNOSIS — J02.0 STREP THROAT: Primary | ICD-10-CM

## 2024-03-20 PROCEDURE — 96372 THER/PROPH/DIAG INJ SC/IM: CPT

## 2024-03-20 PROCEDURE — 99283 EMERGENCY DEPT VISIT LOW MDM: CPT

## 2024-03-20 PROCEDURE — 10004651 HB RX, NO CHARGE ITEM: Performed by: PHYSICIAN ASSISTANT

## 2024-03-20 PROCEDURE — 10002800 HB RX 250 W HCPCS

## 2024-03-20 RX ORDER — KETOROLAC TROMETHAMINE 30 MG/ML
30 INJECTION, SOLUTION INTRAMUSCULAR; INTRAVENOUS ONCE
Status: COMPLETED | OUTPATIENT
Start: 2024-03-20 | End: 2024-03-20

## 2024-03-20 RX ORDER — ACETAMINOPHEN 325 MG/1
650 TABLET ORAL ONCE
Status: COMPLETED | OUTPATIENT
Start: 2024-03-20 | End: 2024-03-20

## 2024-03-20 RX ADMIN — KETOROLAC TROMETHAMINE 30 MG: 30 INJECTION, SOLUTION INTRAMUSCULAR at 18:45

## 2024-03-20 RX ADMIN — ACETAMINOPHEN 650 MG: 325 TABLET ORAL at 15:43

## 2024-03-20 RX ADMIN — PENICILLIN G BENZATHINE 1.2 MILLION UNITS: 1200000 INJECTION, SUSPENSION INTRAMUSCULAR at 18:48

## 2024-03-20 SDOH — SOCIAL STABILITY: SOCIAL INSECURITY: HOW OFTEN DOES ANYONE, INCLUDING FAMILY AND FRIENDS, INSULT OR TALK DOWN TO YOU?: NEVER

## 2024-03-20 SDOH — SOCIAL STABILITY: SOCIAL INSECURITY: HOW OFTEN DOES ANYONE, INCLUDING FAMILY AND FRIENDS, SCREAM OR CURSE AT YOU?: NEVER

## 2024-03-20 SDOH — SOCIAL STABILITY: SOCIAL INSECURITY: HOW OFTEN DOES ANYONE, INCLUDING FAMILY AND FRIENDS, PHYSICALLY HURT YOU?: NEVER

## 2024-03-20 SDOH — SOCIAL STABILITY: SOCIAL INSECURITY: HOW OFTEN DOES ANYONE, INCLUDING FAMILY AND FRIENDS, THREATEN YOU WITH HARM?: NEVER

## 2024-03-20 ASSESSMENT — PAIN SCALES - GENERAL: PAINLEVEL_OUTOF10: 8

## 2024-03-21 ASSESSMENT — ENCOUNTER SYMPTOMS
ABDOMINAL PAIN: 0
CONSTIPATION: 0
DIARRHEA: 0
FEVER: 0
VOMITING: 0
CHILLS: 0
TROUBLE SWALLOWING: 0
SINUS PRESSURE: 0
FATIGUE: 0
SORE THROAT: 1
SINUS PAIN: 0
SHORTNESS OF BREATH: 0
NAUSEA: 0
VOICE CHANGE: 0

## 2024-07-09 ENCOUNTER — APPOINTMENT (OUTPATIENT)
Dept: CT IMAGING | Age: 34
DRG: 113 | End: 2024-07-09
Attending: EMERGENCY MEDICINE

## 2024-07-09 ENCOUNTER — HOSPITAL ENCOUNTER (INPATIENT)
Age: 34
LOS: 1 days | Discharge: HOME | DRG: 113 | End: 2024-07-10
Attending: EMERGENCY MEDICINE | Admitting: HOSPITALIST

## 2024-07-09 DIAGNOSIS — J36 PERITONSILLAR ABSCESS: Primary | ICD-10-CM

## 2024-07-09 LAB
ALBUMIN SERPL-MCNC: 3.8 G/DL (ref 3.6–5.1)
ALBUMIN/GLOB SERPL: 1 {RATIO} (ref 1–2.4)
ALP SERPL-CCNC: 79 UNITS/L (ref 45–117)
ALT SERPL-CCNC: 16 UNITS/L
ANION GAP SERPL CALC-SCNC: 11 MMOL/L (ref 7–19)
AST SERPL-CCNC: 9 UNITS/L
BASOPHILS # BLD: 0.1 K/MCL (ref 0–0.3)
BASOPHILS NFR BLD: 0 %
BILIRUB SERPL-MCNC: 0.2 MG/DL (ref 0.2–1)
BUN SERPL-MCNC: 12 MG/DL (ref 6–20)
BUN/CREAT SERPL: 15 (ref 7–25)
CALCIUM SERPL-MCNC: 8.7 MG/DL (ref 8.4–10.2)
CHLORIDE SERPL-SCNC: 102 MMOL/L (ref 97–110)
CO2 SERPL-SCNC: 32 MMOL/L (ref 21–32)
CREAT SERPL-MCNC: 0.78 MG/DL (ref 0.67–1.17)
DEPRECATED RDW RBC: 43.5 FL (ref 39–50)
EGFRCR SERPLBLD CKD-EPI 2021: >90 ML/MIN/{1.73_M2}
EOSINOPHIL # BLD: 0.5 K/MCL (ref 0–0.5)
EOSINOPHIL NFR BLD: 4 %
ERYTHROCYTE [DISTWIDTH] IN BLOOD: 13.6 % (ref 11–15)
FASTING DURATION TIME PATIENT: NORMAL H
GLOBULIN SER-MCNC: 3.7 G/DL (ref 2–4)
GLUCOSE SERPL-MCNC: 80 MG/DL (ref 70–99)
HCT VFR BLD CALC: 39.4 % (ref 39–51)
HGB BLD-MCNC: 13.4 G/DL (ref 13–17)
IMM GRANULOCYTES # BLD AUTO: 0 K/MCL (ref 0–0.2)
IMM GRANULOCYTES # BLD: 0 %
LYMPHOCYTES # BLD: 2.1 K/MCL (ref 1–4.8)
LYMPHOCYTES NFR BLD: 17 %
MCH RBC QN AUTO: 29.5 PG (ref 26–34)
MCHC RBC AUTO-ENTMCNC: 34 G/DL (ref 32–36.5)
MCV RBC AUTO: 86.8 FL (ref 78–100)
MONOCYTES # BLD: 1.1 K/MCL (ref 0.3–0.9)
MONOCYTES NFR BLD: 9 %
NEUTROPHILS # BLD: 8.7 K/MCL (ref 1.8–7.7)
NEUTROPHILS NFR BLD: 70 %
NRBC BLD MANUAL-RTO: 0 /100 WBC
PLATELET # BLD AUTO: 233 K/MCL (ref 140–450)
POTASSIUM SERPL-SCNC: 4.1 MMOL/L (ref 3.4–5.1)
PROT SERPL-MCNC: 7.5 G/DL (ref 6.4–8.2)
RBC # BLD: 4.54 MIL/MCL (ref 4.5–5.9)
S PYO DNA THROAT QL NAA+PROBE: DETECTED
SODIUM SERPL-SCNC: 141 MMOL/L (ref 135–145)
WBC # BLD: 12.4 K/MCL (ref 4.2–11)

## 2024-07-09 PROCEDURE — 10002807 HB RX 258: Performed by: EMERGENCY MEDICINE

## 2024-07-09 PROCEDURE — 70491 CT SOFT TISSUE NECK W/DYE: CPT | Performed by: RADIOLOGY

## 2024-07-09 PROCEDURE — 10002800 HB RX 250 W HCPCS: Performed by: EMERGENCY MEDICINE

## 2024-07-09 PROCEDURE — 85025 COMPLETE CBC W/AUTO DIFF WBC: CPT | Performed by: EMERGENCY MEDICINE

## 2024-07-09 PROCEDURE — 10002805 HB CONTRAST AGENT: Performed by: RADIOLOGY

## 2024-07-09 PROCEDURE — 10000008 HB ROOM CHARGE ICU OR CCU

## 2024-07-09 PROCEDURE — 96375 TX/PRO/DX INJ NEW DRUG ADDON: CPT

## 2024-07-09 PROCEDURE — 87651 STREP A DNA AMP PROBE: CPT | Performed by: EMERGENCY MEDICINE

## 2024-07-09 PROCEDURE — 96361 HYDRATE IV INFUSION ADD-ON: CPT

## 2024-07-09 PROCEDURE — 70491 CT SOFT TISSUE NECK W/DYE: CPT

## 2024-07-09 PROCEDURE — 96374 THER/PROPH/DIAG INJ IV PUSH: CPT

## 2024-07-09 PROCEDURE — 10002807 HB RX 258: Performed by: RADIOLOGY

## 2024-07-09 PROCEDURE — 99285 EMERGENCY DEPT VISIT HI MDM: CPT | Performed by: EMERGENCY MEDICINE

## 2024-07-09 PROCEDURE — 99284 EMERGENCY DEPT VISIT MOD MDM: CPT

## 2024-07-09 PROCEDURE — 80053 COMPREHEN METABOLIC PANEL: CPT | Performed by: EMERGENCY MEDICINE

## 2024-07-09 PROCEDURE — 36415 COLL VENOUS BLD VENIPUNCTURE: CPT

## 2024-07-09 PROCEDURE — 99223 1ST HOSP IP/OBS HIGH 75: CPT | Performed by: HOSPITALIST

## 2024-07-09 RX ORDER — DEXAMETHASONE SODIUM PHOSPHATE 10 MG/ML
10 INJECTION, SOLUTION INTRAMUSCULAR; INTRAVENOUS ONCE
Status: COMPLETED | OUTPATIENT
Start: 2024-07-09 | End: 2024-07-09

## 2024-07-09 RX ORDER — SODIUM CHLORIDE 9 MG/ML
INJECTION, SOLUTION INTRAVENOUS CONTINUOUS
Status: DISCONTINUED | OUTPATIENT
Start: 2024-07-09 | End: 2024-07-10

## 2024-07-09 RX ORDER — KETOROLAC TROMETHAMINE 15 MG/ML
15 INJECTION, SOLUTION INTRAMUSCULAR; INTRAVENOUS ONCE
Status: COMPLETED | OUTPATIENT
Start: 2024-07-09 | End: 2024-07-09

## 2024-07-09 RX ORDER — CEFAZOLIN SODIUM/WATER 1 G/10 ML
1000 SYRINGE (ML) INTRAVENOUS ONCE
Status: COMPLETED | OUTPATIENT
Start: 2024-07-09 | End: 2024-07-09

## 2024-07-09 RX ADMIN — KETOROLAC TROMETHAMINE 15 MG: 15 INJECTION, SOLUTION INTRAMUSCULAR; INTRAVENOUS at 22:04

## 2024-07-09 RX ADMIN — SODIUM CHLORIDE 1000 ML: 9 INJECTION, SOLUTION INTRAVENOUS at 22:07

## 2024-07-09 RX ADMIN — CEFTRIAXONE SODIUM 1000 MG: 10 INJECTION, POWDER, FOR SOLUTION INTRAVENOUS at 22:06

## 2024-07-09 RX ADMIN — DEXAMETHASONE SODIUM PHOSPHATE 10 MG: 10 INJECTION, SOLUTION INTRAMUSCULAR; INTRAVENOUS at 22:01

## 2024-07-09 RX ADMIN — IOHEXOL 150 ML: 300 INJECTION, SOLUTION INTRAVENOUS at 22:12

## 2024-07-09 RX ADMIN — SODIUM CHLORIDE 100 ML: 9 INJECTION, SOLUTION INTRAVENOUS at 22:12

## 2024-07-09 SDOH — SOCIAL STABILITY: SOCIAL INSECURITY: HOW OFTEN DOES ANYONE, INCLUDING FAMILY AND FRIENDS, THREATEN YOU WITH HARM?: NEVER

## 2024-07-09 SDOH — SOCIAL STABILITY: SOCIAL INSECURITY: HOW OFTEN DOES ANYONE, INCLUDING FAMILY AND FRIENDS, SCREAM OR CURSE AT YOU?: NEVER

## 2024-07-09 SDOH — SOCIAL STABILITY: SOCIAL INSECURITY: HOW OFTEN DOES ANYONE, INCLUDING FAMILY AND FRIENDS, PHYSICALLY HURT YOU?: NEVER

## 2024-07-09 SDOH — SOCIAL STABILITY: SOCIAL INSECURITY: HOW OFTEN DOES ANYONE, INCLUDING FAMILY AND FRIENDS, INSULT OR TALK DOWN TO YOU?: NEVER

## 2024-07-09 ASSESSMENT — PAIN SCALES - GENERAL
PAINLEVEL_OUTOF10: 8
PAINLEVEL_OUTOF10: 8

## 2024-07-09 ASSESSMENT — PAIN DESCRIPTION - PAIN TYPE: TYPE: ACUTE PAIN

## 2024-07-10 VITALS
TEMPERATURE: 98 F | WEIGHT: 205.91 LBS | OXYGEN SATURATION: 96 % | DIASTOLIC BLOOD PRESSURE: 63 MMHG | HEART RATE: 61 BPM | RESPIRATION RATE: 18 BRPM | BODY MASS INDEX: 25.6 KG/M2 | SYSTOLIC BLOOD PRESSURE: 143 MMHG | HEIGHT: 75 IN

## 2024-07-10 PROBLEM — J02.0 ACUTE STREPTOCOCCAL PHARYNGITIS: Status: ACTIVE | Noted: 2024-07-10

## 2024-07-10 LAB
ANION GAP SERPL CALC-SCNC: 11 MMOL/L (ref 7–19)
BASOPHILS # BLD: 0 K/MCL (ref 0–0.3)
BASOPHILS NFR BLD: 0 %
BUN SERPL-MCNC: 11 MG/DL (ref 6–20)
BUN/CREAT SERPL: 16 (ref 7–25)
CALCIUM SERPL-MCNC: 8.6 MG/DL (ref 8.4–10.2)
CHLORIDE SERPL-SCNC: 103 MMOL/L (ref 97–110)
CO2 SERPL-SCNC: 30 MMOL/L (ref 21–32)
CREAT SERPL-MCNC: 0.7 MG/DL (ref 0.67–1.17)
DEPRECATED RDW RBC: 42.5 FL (ref 39–50)
EGFRCR SERPLBLD CKD-EPI 2021: >90 ML/MIN/{1.73_M2}
EOSINOPHIL # BLD: 0 K/MCL (ref 0–0.5)
EOSINOPHIL NFR BLD: 0 %
ERYTHROCYTE [DISTWIDTH] IN BLOOD: 13.3 % (ref 11–15)
FASTING DURATION TIME PATIENT: ABNORMAL H
GLUCOSE BLDC GLUCOMTR-MCNC: 114 MG/DL (ref 70–99)
GLUCOSE BLDC GLUCOMTR-MCNC: 114 MG/DL (ref 70–99)
GLUCOSE BLDC GLUCOMTR-MCNC: 141 MG/DL (ref 70–99)
GLUCOSE SERPL-MCNC: 137 MG/DL (ref 70–99)
HCT VFR BLD CALC: 38 % (ref 39–51)
HGB BLD-MCNC: 12.8 G/DL (ref 13–17)
IMM GRANULOCYTES # BLD AUTO: 0 K/MCL (ref 0–0.2)
IMM GRANULOCYTES # BLD: 0 %
LACTATE BLDV-SCNC: 1.2 MMOL/L (ref 0–2)
LYMPHOCYTES # BLD: 0.8 K/MCL (ref 1–4.8)
LYMPHOCYTES NFR BLD: 6 %
MAGNESIUM SERPL-MCNC: 2 MG/DL (ref 1.7–2.4)
MCH RBC QN AUTO: 29.4 PG (ref 26–34)
MCHC RBC AUTO-ENTMCNC: 33.7 G/DL (ref 32–36.5)
MCV RBC AUTO: 87.4 FL (ref 78–100)
MONOCYTES # BLD: 0.2 K/MCL (ref 0.3–0.9)
MONOCYTES NFR BLD: 1 %
NEUTROPHILS # BLD: 12.9 K/MCL (ref 1.8–7.7)
NEUTROPHILS NFR BLD: 93 %
NRBC BLD MANUAL-RTO: 0 /100 WBC
PLATELET # BLD AUTO: 212 K/MCL (ref 140–450)
POTASSIUM SERPL-SCNC: 4.1 MMOL/L (ref 3.4–5.1)
POTASSIUM SERPL-SCNC: 4.5 MMOL/L (ref 3.4–5.1)
RAINBOW EXTRA TUBES HOLD SPECIMEN: NORMAL
RBC # BLD: 4.35 MIL/MCL (ref 4.5–5.9)
SODIUM SERPL-SCNC: 139 MMOL/L (ref 135–145)
WBC # BLD: 14 K/MCL (ref 4.2–11)

## 2024-07-10 PROCEDURE — 83605 ASSAY OF LACTIC ACID: CPT | Performed by: HOSPITALIST

## 2024-07-10 PROCEDURE — 10002807 HB RX 258: Performed by: HOSPITALIST

## 2024-07-10 PROCEDURE — 36415 COLL VENOUS BLD VENIPUNCTURE: CPT | Performed by: HOSPITALIST

## 2024-07-10 PROCEDURE — 10003445 HB TELEMETRY PER DAY

## 2024-07-10 PROCEDURE — 82962 GLUCOSE BLOOD TEST: CPT

## 2024-07-10 PROCEDURE — 85025 COMPLETE CBC W/AUTO DIFF WBC: CPT | Performed by: HOSPITALIST

## 2024-07-10 PROCEDURE — 10004651 HB RX, NO CHARGE ITEM: Performed by: HOSPITALIST

## 2024-07-10 PROCEDURE — 10002800 HB RX 250 W HCPCS: Performed by: HOSPITALIST

## 2024-07-10 PROCEDURE — 10002807 HB RX 258: Performed by: EMERGENCY MEDICINE

## 2024-07-10 PROCEDURE — 93005 ELECTROCARDIOGRAM TRACING: CPT | Performed by: HOSPITALIST

## 2024-07-10 PROCEDURE — 80048 BASIC METABOLIC PNL TOTAL CA: CPT | Performed by: HOSPITALIST

## 2024-07-10 PROCEDURE — 10002801 HB RX 250 W/O HCPCS: Performed by: HOSPITALIST

## 2024-07-10 PROCEDURE — 83735 ASSAY OF MAGNESIUM: CPT | Performed by: HOSPITALIST

## 2024-07-10 PROCEDURE — 10002800 HB RX 250 W HCPCS: Performed by: OTOLARYNGOLOGY

## 2024-07-10 PROCEDURE — 99253 IP/OBS CNSLTJ NEW/EST LOW 45: CPT | Performed by: OTOLARYNGOLOGY

## 2024-07-10 PROCEDURE — S0310 HOSPITALIST VISIT: HCPCS

## 2024-07-10 PROCEDURE — 93010 ELECTROCARDIOGRAM REPORT: CPT | Performed by: INTERNAL MEDICINE

## 2024-07-10 PROCEDURE — 0C9P0ZZ DRAINAGE OF TONSILS, OPEN APPROACH: ICD-10-PCS | Performed by: OTOLARYNGOLOGY

## 2024-07-10 PROCEDURE — 84132 ASSAY OF SERUM POTASSIUM: CPT | Performed by: HOSPITALIST

## 2024-07-10 PROCEDURE — 42700 I&D ABSCESS PERITONSILLAR: CPT | Performed by: OTOLARYNGOLOGY

## 2024-07-10 PROCEDURE — 99239 HOSP IP/OBS DSCHRG MGMT >30: CPT | Performed by: HOSPITALIST

## 2024-07-10 PROCEDURE — 10003562 HB COUNTER - EKG

## 2024-07-10 PROCEDURE — 87040 BLOOD CULTURE FOR BACTERIA: CPT | Performed by: HOSPITALIST

## 2024-07-10 RX ORDER — CEFAZOLIN SODIUM/WATER 2 G/20 ML
2000 SYRINGE (ML) INTRAVENOUS DAILY
Status: DISCONTINUED | OUTPATIENT
Start: 2024-07-10 | End: 2024-07-10

## 2024-07-10 RX ORDER — NICOTINE 21 MG/24HR
1 PATCH, TRANSDERMAL 24 HOURS TRANSDERMAL DAILY
Qty: 30 PATCH | Refills: 0 | Status: SHIPPED | OUTPATIENT
Start: 2024-07-11

## 2024-07-10 RX ORDER — 0.9 % SODIUM CHLORIDE 0.9 %
2 VIAL (ML) INJECTION EVERY 12 HOURS SCHEDULED
Status: DISCONTINUED | OUTPATIENT
Start: 2024-07-10 | End: 2024-07-10 | Stop reason: HOSPADM

## 2024-07-10 RX ORDER — DEXAMETHASONE SODIUM PHOSPHATE 10 MG/ML
10 INJECTION, SOLUTION INTRAMUSCULAR; INTRAVENOUS EVERY 8 HOURS SCHEDULED
Status: DISCONTINUED | OUTPATIENT
Start: 2024-07-10 | End: 2024-07-10 | Stop reason: HOSPADM

## 2024-07-10 RX ORDER — CEFAZOLIN SODIUM/WATER 1 G/10 ML
1000 SYRINGE (ML) INTRAVENOUS ONCE
Status: COMPLETED | OUTPATIENT
Start: 2024-07-10 | End: 2024-07-10

## 2024-07-10 RX ORDER — KETOROLAC TROMETHAMINE 30 MG/ML
15 INJECTION, SOLUTION INTRAMUSCULAR; INTRAVENOUS EVERY 6 HOURS PRN
Status: DISCONTINUED | OUTPATIENT
Start: 2024-07-10 | End: 2024-07-10 | Stop reason: HOSPADM

## 2024-07-10 RX ORDER — HYDRALAZINE HYDROCHLORIDE 20 MG/ML
10 INJECTION INTRAMUSCULAR; INTRAVENOUS EVERY 6 HOURS PRN
Status: DISCONTINUED | OUTPATIENT
Start: 2024-07-10 | End: 2024-07-10 | Stop reason: HOSPADM

## 2024-07-10 RX ORDER — FAMOTIDINE 10 MG/ML
20 INJECTION, SOLUTION INTRAVENOUS EVERY 12 HOURS SCHEDULED
Status: DISCONTINUED | OUTPATIENT
Start: 2024-07-10 | End: 2024-07-10 | Stop reason: HOSPADM

## 2024-07-10 RX ORDER — DEXTROSE MONOHYDRATE AND SODIUM CHLORIDE 5; .45 G/100ML; G/100ML
INJECTION, SOLUTION INTRAVENOUS CONTINUOUS
Status: DISCONTINUED | OUTPATIENT
Start: 2024-07-10 | End: 2024-07-10

## 2024-07-10 RX ORDER — DEXTROSE MONOHYDRATE AND SODIUM CHLORIDE 5; .45 G/100ML; G/100ML
INJECTION, SOLUTION INTRAVENOUS CONTINUOUS
Status: DISCONTINUED | OUTPATIENT
Start: 2024-07-10 | End: 2024-07-10 | Stop reason: HOSPADM

## 2024-07-10 RX ORDER — FAMOTIDINE 20 MG/1
20 TABLET, FILM COATED ORAL 2 TIMES DAILY
Qty: 14 TABLET | Refills: 0 | Status: SHIPPED | OUTPATIENT
Start: 2024-07-10 | End: 2024-07-17

## 2024-07-10 RX ORDER — SODIUM CHLORIDE 9 MG/ML
INJECTION, SOLUTION INTRAVENOUS CONTINUOUS PRN
Status: DISCONTINUED | OUTPATIENT
Start: 2024-07-10 | End: 2024-07-10 | Stop reason: HOSPADM

## 2024-07-10 RX ORDER — PREDNISONE 20 MG/1
60 TABLET ORAL DAILY
Qty: 15 TABLET | Refills: 0 | Status: SHIPPED | OUTPATIENT
Start: 2024-07-10 | End: 2024-07-15

## 2024-07-10 RX ORDER — NICOTINE 21 MG/24HR
1 PATCH, TRANSDERMAL 24 HOURS TRANSDERMAL DAILY
Status: DISCONTINUED | OUTPATIENT
Start: 2024-07-10 | End: 2024-07-10 | Stop reason: HOSPADM

## 2024-07-10 RX ORDER — AMOXICILLIN AND CLAVULANATE POTASSIUM 875; 125 MG/1; MG/1
1 TABLET, FILM COATED ORAL EVERY 12 HOURS
Qty: 20 TABLET | Refills: 0 | Status: SHIPPED | OUTPATIENT
Start: 2024-07-10 | End: 2024-07-20

## 2024-07-10 RX ADMIN — CEFTRIAXONE SODIUM 1000 MG: 10 INJECTION, POWDER, FOR SOLUTION INTRAVENOUS at 00:48

## 2024-07-10 RX ADMIN — SODIUM CHLORIDE: 9 INJECTION, SOLUTION INTRAVENOUS at 00:26

## 2024-07-10 RX ADMIN — DEXTROSE AND SODIUM CHLORIDE: 5; 450 INJECTION, SOLUTION INTRAVENOUS at 10:20

## 2024-07-10 RX ADMIN — DEXAMETHASONE SODIUM PHOSPHATE 10 MG: 10 INJECTION, SOLUTION INTRAMUSCULAR; INTRAVENOUS at 12:50

## 2024-07-10 RX ADMIN — FAMOTIDINE 20 MG: 10 INJECTION INTRAVENOUS at 10:20

## 2024-07-10 RX ADMIN — SODIUM CHLORIDE, PRESERVATIVE FREE 2 ML: 5 INJECTION INTRAVENOUS at 10:21

## 2024-07-10 RX ADMIN — PIPERACILLIN SODIUM AND TAZOBACTAM SODIUM 3.38 G: 3; .375 INJECTION, POWDER, FOR SOLUTION INTRAVENOUS at 17:22

## 2024-07-10 RX ADMIN — PIPERACILLIN SODIUM AND TAZOBACTAM SODIUM 3.38 G: 3; .375 INJECTION, POWDER, FOR SOLUTION INTRAVENOUS at 10:20

## 2024-07-10 RX ADMIN — DEXTROSE AND SODIUM CHLORIDE: 5; 450 INJECTION, SOLUTION INTRAVENOUS at 00:50

## 2024-07-10 RX ADMIN — SODIUM CHLORIDE, PRESERVATIVE FREE 2 ML: 5 INJECTION INTRAVENOUS at 01:46

## 2024-07-10 SDOH — SOCIAL STABILITY: SOCIAL INSECURITY: HOW OFTEN DOES ANYONE, INCLUDING FAMILY AND FRIENDS, PHYSICALLY HURT YOU?: NEVER

## 2024-07-10 SDOH — ECONOMIC STABILITY: HOUSING INSECURITY: WHAT IS YOUR LIVING SITUATION TODAY?: I HAVE A STEADY PLACE TO LIVE

## 2024-07-10 SDOH — ECONOMIC STABILITY: HOUSING INSECURITY: WHAT IS YOUR LIVING SITUATION TODAY?: FAMILY MEMBERS

## 2024-07-10 SDOH — ECONOMIC STABILITY: HOUSING INSECURITY: DO YOU HAVE PROBLEMS WITH ANY OF THE FOLLOWING?: NONE OF THE ABOVE

## 2024-07-10 SDOH — SOCIAL STABILITY: SOCIAL NETWORK: SUPPORT SYSTEMS: FAMILY MEMBERS

## 2024-07-10 SDOH — HEALTH STABILITY: PHYSICAL HEALTH: DO YOU HAVE SERIOUS DIFFICULTY WALKING OR CLIMBING STAIRS?: NO

## 2024-07-10 SDOH — ECONOMIC STABILITY: INCOME INSECURITY: IN THE PAST 12 MONTHS, HAS THE ELECTRIC, GAS, OIL, OR WATER COMPANY THREATENED TO SHUT OFF SERVICE IN YOUR HOME?: NO

## 2024-07-10 SDOH — SOCIAL STABILITY: SOCIAL INSECURITY: HOW OFTEN DOES ANYONE, INCLUDING FAMILY AND FRIENDS, THREATEN YOU WITH HARM?: NEVER

## 2024-07-10 SDOH — ECONOMIC STABILITY: FOOD INSECURITY: WITHIN THE PAST 12 MONTHS, THE FOOD YOU BOUGHT JUST DIDN'T LAST AND YOU DIDN'T HAVE MONEY TO GET MORE.: NEVER TRUE

## 2024-07-10 SDOH — HEALTH STABILITY: GENERAL: BECAUSE OF A PHYSICAL, MENTAL, OR EMOTIONAL CONDITION, DO YOU HAVE DIFFICULTY DOING ERRANDS ALONE?: NO

## 2024-07-10 SDOH — SOCIAL STABILITY: SOCIAL NETWORK
HOW OFTEN DO YOU SEE OR TALK TO PEOPLE THAT YOU CARE ABOUT AND FEEL CLOSE TO? (FOR EXAMPLE: TALKING TO FRIENDS ON THE PHONE, VISITING FRIENDS OR FAMILY, GOING TO CHURCH OR CLUB MEETINGS): 3 TO 5 TIMES A WEEK

## 2024-07-10 SDOH — ECONOMIC STABILITY: HOUSING INSECURITY: WHAT IS YOUR LIVING SITUATION TODAY?: HOUSE

## 2024-07-10 SDOH — HEALTH STABILITY: GENERAL
BECAUSE OF A PHYSICAL, MENTAL, OR EMOTIONAL CONDITION, DO YOU HAVE SERIOUS DIFFICULTY CONCENTRATING, REMEMBERING OR MAKING DECISIONS?: NO

## 2024-07-10 SDOH — SOCIAL STABILITY: SOCIAL INSECURITY: HOW OFTEN DOES ANYONE, INCLUDING FAMILY AND FRIENDS, INSULT OR TALK DOWN TO YOU?: NEVER

## 2024-07-10 SDOH — ECONOMIC STABILITY: TRANSPORTATION INSECURITY
IN THE PAST 12 MONTHS, HAS LACK OF RELIABLE TRANSPORTATION KEPT YOU FROM MEDICAL APPOINTMENTS, MEETINGS, WORK OR FROM GETTING THINGS NEEDED FOR DAILY LIVING?: NO

## 2024-07-10 SDOH — ECONOMIC STABILITY: HOUSING INSECURITY: WHAT IS YOUR LIVING SITUATION TODAY?: CONDO

## 2024-07-10 SDOH — ECONOMIC STABILITY: GENERAL: WOULD YOU LIKE HELP WITH ANY OF THE FOLLOWING NEEDS?: I DON'T WANT HELP WITH ANY OF THESE

## 2024-07-10 SDOH — HEALTH STABILITY: PHYSICAL HEALTH: DO YOU HAVE DIFFICULTY DRESSING OR BATHING?: NO

## 2024-07-10 SDOH — ECONOMIC STABILITY: GENERAL

## 2024-07-10 SDOH — SOCIAL STABILITY: SOCIAL INSECURITY: HOW OFTEN DOES ANYONE, INCLUDING FAMILY AND FRIENDS, SCREAM OR CURSE AT YOU?: NEVER

## 2024-07-10 SDOH — SOCIAL STABILITY: SOCIAL NETWORK: SUPPORT SYSTEMS: FAMILY MEMBERS;FRIENDS

## 2024-07-10 ASSESSMENT — ACTIVITIES OF DAILY LIVING (ADL)
RECENT_DECLINE_ADL: NO
ADL_SHORT_OF_BREATH: NO
ADL_BEFORE_ADMISSION: INDEPENDENT
ADL_SCORE: 12

## 2024-07-10 ASSESSMENT — COLUMBIA-SUICIDE SEVERITY RATING SCALE - C-SSRS
IS THE PATIENT ABLE TO COMPLETE C-SSRS: YES
6. HAVE YOU EVER DONE ANYTHING, STARTED TO DO ANYTHING, OR PREPARED TO DO ANYTHING TO END YOUR LIFE?: NO
2. HAVE YOU ACTUALLY HAD ANY THOUGHTS OF KILLING YOURSELF?: NO
1. WITHIN THE PAST MONTH, HAVE YOU WISHED YOU WERE DEAD OR WISHED YOU COULD GO TO SLEEP AND NOT WAKE UP?: NO

## 2024-07-10 ASSESSMENT — LIFESTYLE VARIABLES
HOW OFTEN DO YOU HAVE A DRINK CONTAINING ALCOHOL: MONTHLY OR LESS
HOW OFTEN DO YOU HAVE 6 OR MORE DRINKS ON ONE OCCASION: NEVER
ALCOHOL_USE_STATUS: NO OR LOW RISK WITH VALIDATED TOOL
HOW MANY STANDARD DRINKS CONTAINING ALCOHOL DO YOU HAVE ON A TYPICAL DAY: 0,1 OR 2
AUDIT-C TOTAL SCORE: 1

## 2024-07-10 ASSESSMENT — PATIENT HEALTH QUESTIONNAIRE - PHQ9
1. LITTLE INTEREST OR PLEASURE IN DOING THINGS: NOT AT ALL
SUM OF ALL RESPONSES TO PHQ9 QUESTIONS 1 AND 2: 0
SUM OF ALL RESPONSES TO PHQ9 QUESTIONS 1 AND 2: 0
IS PATIENT ABLE TO COMPLETE PHQ2 OR PHQ9: YES
2. FEELING DOWN, DEPRESSED OR HOPELESS: NOT AT ALL
SUM OF ALL RESPONSES TO PHQ9 QUESTIONS 1 AND 2: 0
CLINICAL INTERPRETATION OF PHQ2 SCORE: NO FURTHER SCREENING NEEDED
CLINICAL INTERPRETATION OF PHQ2 SCORE: NO FURTHER SCREENING NEEDED
IS PATIENT ABLE TO COMPLETE PHQ2 OR PHQ9: YES

## 2024-07-10 ASSESSMENT — PAIN SCALES - GENERAL
PAINLEVEL_OUTOF10: 0
PAINLEVEL_OUTOF10: 3
PAINLEVEL_OUTOF10: 3
PAINLEVEL_OUTOF10: 5
PAINLEVEL_OUTOF10: 2

## 2024-07-12 LAB
ATRIAL RATE (BPM): 75
P AXIS (DEGREES): 36
PR-INTERVAL (MSEC): 168
QRS-INTERVAL (MSEC): 88
QT-INTERVAL (MSEC): 382
QTC: 429
R AXIS (DEGREES): 57
REPORT TEXT: NORMAL
T AXIS (DEGREES): 19
VENTRICULAR RATE EKG/MIN (BPM): 76

## 2024-07-13 LAB
BACTERIA BLD CULT: NORMAL
BACTERIA BLD CULT: NORMAL

## 2024-07-15 LAB
BACTERIA BLD CULT: NORMAL
BACTERIA BLD CULT: NORMAL

## 2025-03-21 ENCOUNTER — HOSPITAL ENCOUNTER (EMERGENCY)
Age: 35
Discharge: HOME | End: 2025-03-21

## 2025-03-21 ENCOUNTER — APPOINTMENT (OUTPATIENT)
Dept: CT IMAGING | Age: 35
End: 2025-03-21

## 2025-03-21 VITALS
RESPIRATION RATE: 18 BRPM | TEMPERATURE: 99.3 F | HEART RATE: 89 BPM | OXYGEN SATURATION: 97 % | WEIGHT: 231.7 LBS | BODY MASS INDEX: 31.38 KG/M2 | DIASTOLIC BLOOD PRESSURE: 60 MMHG | SYSTOLIC BLOOD PRESSURE: 118 MMHG | HEIGHT: 72 IN

## 2025-03-21 DIAGNOSIS — J03.90 PHLEGMONOUS TONSILLITIS: Primary | ICD-10-CM

## 2025-03-21 LAB
FLUAV RNA RESP QL NAA+PROBE: NOT DETECTED
FLUBV RNA RESP QL NAA+PROBE: NOT DETECTED
HETEROPH AB SERPL QL IA: NEGATIVE
RSV AG NPH QL IA.RAPID: NOT DETECTED
S PYO DNA THROAT QL NAA+PROBE: NOT DETECTED
SARS-COV-2 RNA RESP QL NAA+PROBE: NOT DETECTED
SERVICE CMNT-IMP: NORMAL
SERVICE CMNT-IMP: NORMAL

## 2025-03-21 PROCEDURE — 0241U COVID/FLU/RSV PANEL: CPT

## 2025-03-21 PROCEDURE — 10002800 HB RX 250 W HCPCS

## 2025-03-21 PROCEDURE — 99284 EMERGENCY DEPT VISIT MOD MDM: CPT

## 2025-03-21 PROCEDURE — 10002803 HB RX 637: Performed by: PHYSICIAN ASSISTANT

## 2025-03-21 PROCEDURE — 96374 THER/PROPH/DIAG INJ IV PUSH: CPT

## 2025-03-21 PROCEDURE — 10002805 HB CONTRAST AGENT

## 2025-03-21 PROCEDURE — 86308 HETEROPHILE ANTIBODY SCREEN: CPT

## 2025-03-21 PROCEDURE — 87651 STREP A DNA AMP PROBE: CPT | Performed by: PHYSICIAN ASSISTANT

## 2025-03-21 PROCEDURE — 36415 COLL VENOUS BLD VENIPUNCTURE: CPT

## 2025-03-21 PROCEDURE — 10002803 HB RX 637

## 2025-03-21 PROCEDURE — 70491 CT SOFT TISSUE NECK W/DYE: CPT

## 2025-03-21 PROCEDURE — 86665 EPSTEIN-BARR CAPSID VCA: CPT | Performed by: CLINICAL MEDICAL LABORATORY

## 2025-03-21 RX ORDER — IBUPROFEN 600 MG/1
600 TABLET, FILM COATED ORAL ONCE
Status: COMPLETED | OUTPATIENT
Start: 2025-03-21 | End: 2025-03-21

## 2025-03-21 RX ORDER — METHYLPREDNISOLONE 4 MG/1
4 TABLET ORAL SEE ADMIN INSTRUCTIONS
Qty: 21 TABLET | Refills: 0 | Status: SHIPPED | OUTPATIENT
Start: 2025-03-21

## 2025-03-21 RX ORDER — DEXAMETHASONE SODIUM PHOSPHATE 10 MG/ML
10 INJECTION, SOLUTION INTRAMUSCULAR; INTRAVENOUS ONCE
Status: COMPLETED | OUTPATIENT
Start: 2025-03-21 | End: 2025-03-21

## 2025-03-21 RX ADMIN — DEXAMETHASONE SODIUM PHOSPHATE 10 MG: 10 INJECTION INTRAMUSCULAR; INTRAVENOUS at 18:57

## 2025-03-21 RX ADMIN — IBUPROFEN 600 MG: 600 TABLET, FILM COATED ORAL at 16:56

## 2025-03-21 RX ADMIN — IOHEXOL 125 ML: 300 INJECTION, SOLUTION INTRAVENOUS at 19:16

## 2025-03-21 RX ADMIN — AMOXICILLIN AND CLAVULANATE POTASSIUM 1 TABLET: 875; 125 TABLET, FILM COATED ORAL at 20:56

## 2025-03-21 SDOH — SOCIAL STABILITY: SOCIAL INSECURITY: HOW OFTEN DOES ANYONE, INCLUDING FAMILY AND FRIENDS, INSULT OR TALK DOWN TO YOU?: NEVER

## 2025-03-21 SDOH — SOCIAL STABILITY: SOCIAL INSECURITY: HOW OFTEN DOES ANYONE, INCLUDING FAMILY AND FRIENDS, PHYSICALLY HURT YOU?: NEVER

## 2025-03-21 SDOH — SOCIAL STABILITY: SOCIAL INSECURITY: HOW OFTEN DOES ANYONE, INCLUDING FAMILY AND FRIENDS, THREATEN YOU WITH HARM?: NEVER

## 2025-03-21 SDOH — SOCIAL STABILITY: SOCIAL INSECURITY: HOW OFTEN DOES ANYONE, INCLUDING FAMILY AND FRIENDS, SCREAM OR CURSE AT YOU?: NEVER

## 2025-03-21 ASSESSMENT — PAIN SCALES - GENERAL
PAINLEVEL_OUTOF10: 10
PAINLEVEL_OUTOF10: 10
PAINLEVEL_OUTOF10: 5
PAINLEVEL_OUTOF10: 2

## 2025-03-21 ASSESSMENT — PAIN DESCRIPTION - PAIN TYPE: TYPE: ACUTE PAIN

## 2025-03-23 LAB
EBV VCA IGG SER QL IA: 525 U/ML
EBV VCA IGM SER QL IA: <10 U/ML